# Patient Record
Sex: FEMALE | Race: WHITE | NOT HISPANIC OR LATINO | Employment: UNEMPLOYED | URBAN - NONMETROPOLITAN AREA
[De-identification: names, ages, dates, MRNs, and addresses within clinical notes are randomized per-mention and may not be internally consistent; named-entity substitution may affect disease eponyms.]

---

## 2018-06-12 ENCOUNTER — OFFICE VISIT (OUTPATIENT)
Dept: CARDIOLOGY | Facility: CLINIC | Age: 18
End: 2018-06-12

## 2018-06-12 ENCOUNTER — APPOINTMENT (OUTPATIENT)
Dept: LAB | Facility: HOSPITAL | Age: 18
End: 2018-06-12

## 2018-06-12 VITALS
HEART RATE: 109 BPM | OXYGEN SATURATION: 96 % | BODY MASS INDEX: 43.4 KG/M2 | SYSTOLIC BLOOD PRESSURE: 130 MMHG | WEIGHT: 293 LBS | HEIGHT: 69 IN | DIASTOLIC BLOOD PRESSURE: 96 MMHG

## 2018-06-12 DIAGNOSIS — R00.0 TACHYCARDIA: Primary | ICD-10-CM

## 2018-06-12 DIAGNOSIS — R07.2 PRECORDIAL PAIN: ICD-10-CM

## 2018-06-12 DIAGNOSIS — R06.02 SOB (SHORTNESS OF BREATH): ICD-10-CM

## 2018-06-12 PROCEDURE — 85007 BL SMEAR W/DIFF WBC COUNT: CPT | Performed by: PHYSICIAN ASSISTANT

## 2018-06-12 PROCEDURE — 93000 ELECTROCARDIOGRAM COMPLETE: CPT | Performed by: PHYSICIAN ASSISTANT

## 2018-06-12 PROCEDURE — 99204 OFFICE O/P NEW MOD 45 MIN: CPT | Performed by: PHYSICIAN ASSISTANT

## 2018-06-12 PROCEDURE — 80050 GENERAL HEALTH PANEL: CPT | Performed by: PHYSICIAN ASSISTANT

## 2018-06-12 RX ORDER — VENLAFAXINE HYDROCHLORIDE 150 MG/1
CAPSULE, EXTENDED RELEASE ORAL DAILY
COMMUNITY
Start: 2018-05-10 | End: 2019-05-10 | Stop reason: SDDI

## 2018-06-12 RX ORDER — RANITIDINE 300 MG/1
TABLET ORAL DAILY
COMMUNITY
Start: 2018-06-05 | End: 2021-10-19

## 2018-06-12 RX ORDER — CLOTRIMAZOLE 1 %
CREAM (GRAM) TOPICAL DAILY
COMMUNITY
Start: 2018-03-19 | End: 2019-02-08

## 2018-06-12 RX ORDER — ERGOCALCIFEROL 1.25 MG/1
CAPSULE ORAL WEEKLY
COMMUNITY
Start: 2018-05-25 | End: 2019-02-08

## 2018-06-12 NOTE — PATIENT INSTRUCTIONS
Heart-Healthy Eating Plan  Many factors influence your heart health, including eating and exercise habits. Heart (coronary) risk increases with abnormal blood fat (lipid) levels. Heart-healthy meal planning includes limiting unhealthy fats, increasing healthy fats, and making other small dietary changes. This includes maintaining a healthy body weight to help keep lipid levels within a normal range.  What is my plan?  Your health care provider recommends that you:  · Get no more than _________% of the total calories in your daily diet from fat.  · Limit your intake of saturated fat to less than _________% of your total calories each day.  · Limit the amount of cholesterol in your diet to less than _________ mg per day.  What types of fat should I choose?  · Choose healthy fats more often. Choose monounsaturated and polyunsaturated fats, such as olive oil and canola oil, flaxseeds, walnuts, almonds, and seeds.  · Eat more omega-3 fats. Good choices include salmon, mackerel, sardines, tuna, flaxseed oil, and ground flaxseeds. Aim to eat fish at least two times each week.  · Limit saturated fats. Saturated fats are primarily found in animal products, such as meats, butter, and cream. Plant sources of saturated fats include palm oil, palm kernel oil, and coconut oil.  · Avoid foods with partially hydrogenated oils in them. These contain trans fats. Examples of foods that contain trans fats are stick margarine, some tub margarines, cookies, crackers, and other baked goods.  What general guidelines do I need to follow?  · Check food labels carefully to identify foods with trans fats or high amounts of saturated fat.  · Fill one half of your plate with vegetables and green salads. Eat 4-5 servings of vegetables per day. A serving of vegetables equals 1 cup of raw leafy vegetables, ½ cup of raw or cooked cut-up vegetables, or ½ cup of vegetable juice.  · Fill one fourth of your plate with whole grains. Look for the word  "\"whole\" as the first word in the ingredient list.  · Fill one fourth of your plate with lean protein foods.  · Eat 4-5 servings of fruit per day. A serving of fruit equals one medium whole fruit, ¼ cup of dried fruit, ½ cup of fresh, frozen, or canned fruit, or ½ cup of 100% fruit juice.  · Eat more foods that contain soluble fiber. Examples of foods that contain this type of fiber are apples, broccoli, carrots, beans, peas, and barley. Aim to get 20-30 g of fiber per day.  · Eat more home-cooked food and less restaurant, buffet, and fast food.  · Limit or avoid alcohol.  · Limit foods that are high in starch and sugar.  · Avoid fried foods.  · Cook foods by using methods other than frying. Baking, boiling, grilling, and broiling are all great options. Other fat-reducing suggestions include:  ¨ Removing the skin from poultry.  ¨ Removing all visible fats from meats.  ¨ Skimming the fat off of stews, soups, and gravies before serving them.  ¨ Steaming vegetables in water or broth.  · Lose weight if you are overweight. Losing just 5-10% of your initial body weight can help your overall health and prevent diseases such as diabetes and heart disease.  · Increase your consumption of nuts, legumes, and seeds to 4-5 servings per week. One serving of dried beans or legumes equals ½ cup after being cooked, one serving of nuts equals 1½ ounces, and one serving of seeds equals ½ ounce or 1 tablespoon.  · You may need to monitor your salt (sodium) intake, especially if you have high blood pressure. Talk with your health care provider or dietitian to get more information about reducing sodium.  What foods can I eat?  Grains     Breads, including Nepalese, white, melissa, wheat, raisin, rye, oatmeal, and Italian. Tortillas that are neither fried nor made with lard or trans fat. Low-fat rolls, including hotdog and hamburger buns and English muffins. Biscuits. Muffins. Waffles. Pancakes. Light popcorn. Whole-grain cereals. Flatbread. " [place] toast. Pretzels. Breadsticks. Rusks. Low-fat snacks and crackers, including oyster, saltine, matzo, fany, animal, and rye. Rice and pasta, including brown rice and those that are made with whole wheat.  Vegetables   All vegetables.  Fruits   All fruits, but limit coconut.  Meats and Other Protein Sources   Lean, well-trimmed beef, veal, pork, and lamb. Chicken and turkey without skin. All fish and shellfish. Wild duck, rabbit, pheasant, and venison. Egg whites or low-cholesterol egg substitutes. Dried beans, peas, lentils, and tofu. Seeds and most nuts.  Dairy   Low-fat or nonfat cheeses, including ricotta, string, and mozzarella. Skim or 1% milk that is liquid, powdered, or evaporated. Buttermilk that is made with low-fat milk. Nonfat or low-fat yogurt.  Beverages   Mineral water. Diet carbonated beverages.  Sweets and Desserts   Sherbets and fruit ices. Honey, jam, marmalade, jelly, and syrups. Meringues and gelatins. Pure sugar candy, such as hard candy, jelly beans, gumdrops, mints, marshmallows, and small amounts of dark chocolate. Oliver food cake.  Eat all sweets and desserts in moderation.  Fats and Oils   Nonhydrogenated (trans-free) margarines. Vegetable oils, including soybean, sesame, sunflower, olive, peanut, safflower, corn, canola, and cottonseed. Salad dressings or mayonnaise that are made with a vegetable oil. Limit added fats and oils that you use for cooking, baking, salads, and as spreads.  Other   Cocoa powder. Coffee and tea. All seasonings and condiments.  The items listed above may not be a complete list of recommended foods or beverages. Contact your dietitian for more options.   What foods are not recommended?  Grains   Breads that are made with saturated or trans fats, oils, or whole milk. Croissants. Butter rolls. Cheese breads. Sweet rolls. Donuts. Buttered popcorn. Chow mein noodles. High-fat crackers, such as cheese or butter crackers.  Meats and Other Protein Sources   Fatty  meats, such as hotdogs, short ribs, sausage, spareribs, lemon, ribeye roast or steak, and mutton. High-fat deli meats, such as salami and bologna. Caviar. Domestic duck and goose. Organ meats, such as kidney, liver, sweetbreads, brains, gizzard, chitterlings, and heart.  Dairy   Cream, sour cream, cream cheese, and creamed cottage cheese. Whole milk cheeses, including blue (navjot), Love Fawad, Brie, Regulo, American, Havarti, Swiss, cheddar, Camembert, and [place]. Whole or 2% milk that is liquid, evaporated, or condensed. Whole buttermilk. Cream sauce or high-fat cheese sauce. Yogurt that is made from whole milk.  Beverages   Regular sodas and drinks with added sugar.  Sweets and Desserts   Frosting. Pudding. Cookies. Cakes other than garcia food cake. Candy that has milk chocolate or white chocolate, hydrogenated fat, butter, coconut, or unknown ingredients. Buttered syrups. Full-fat ice cream or ice cream drinks.  Fats and Oils   Gravy that has suet, meat fat, or shortening. Cocoa butter, hydrogenated oils, palm oil, coconut oil, palm kernel oil. These can often be found in baked products, candy, fried foods, nondairy creamers, and whipped toppings. Solid fats and shortenings, including lemon fat, salt pork, lard, and butter. Nondairy cream substitutes, such as coffee creamers and sour cream substitutes. Salad dressings that are made of unknown oils, cheese, or sour cream.  The items listed above may not be a complete list of foods and beverages to avoid. Contact your dietitian for more information.   This information is not intended to replace advice given to you by your health care provider. Make sure you discuss any questions you have with your health care provider.  Document Released: 09/26/2009 Document Revised: 07/07/2017 Document Reviewed: 06/11/2015  [employer] Interactive Patient Education © 2017 [employer]

## 2018-06-13 LAB
ALBUMIN SERPL-MCNC: 4.8 G/DL (ref 3.5–5)
ALBUMIN/GLOB SERPL: 1.9 G/DL (ref 1.5–2.5)
ALP SERPL-CCNC: 132 U/L (ref 35–104)
ALT SERPL W P-5'-P-CCNC: 53 U/L (ref 10–36)
ANION GAP SERPL CALCULATED.3IONS-SCNC: 10.9 MMOL/L (ref 3.6–11.2)
AST SERPL-CCNC: 36 U/L (ref 10–30)
BILIRUB SERPL-MCNC: 0.6 MG/DL (ref 0.2–1.8)
BUN BLD-MCNC: 8 MG/DL (ref 7–21)
BUN/CREAT SERPL: 9.5 (ref 7–25)
CALCIUM SPEC-SCNC: 9.5 MG/DL (ref 7.7–10)
CHLORIDE SERPL-SCNC: 104 MMOL/L (ref 99–112)
CO2 SERPL-SCNC: 25.1 MMOL/L (ref 24.3–31.9)
CREAT BLD-MCNC: 0.84 MG/DL (ref 0.43–1.29)
DEPRECATED RDW RBC AUTO: 40.8 FL (ref 37–54)
EOSINOPHIL # BLD MANUAL: 0.46 10*3/MM3 (ref 0–0.7)
EOSINOPHIL NFR BLD MANUAL: 5 % (ref 0–5)
ERYTHROCYTE [DISTWIDTH] IN BLOOD BY AUTOMATED COUNT: 13.4 % (ref 11.5–14.5)
GFR SERPL CREATININE-BSD FRML MDRD: 87 ML/MIN/1.73
GLOBULIN UR ELPH-MCNC: 2.5 GM/DL
GLUCOSE BLD-MCNC: 92 MG/DL (ref 70–110)
HCT VFR BLD AUTO: 41.5 % (ref 37–47)
HGB BLD-MCNC: 13.5 G/DL (ref 12–16)
HYPOCHROMIA BLD QL: NORMAL
LYMPHOCYTES # BLD MANUAL: 2.93 10*3/MM3 (ref 1–3)
LYMPHOCYTES NFR BLD MANUAL: 32 % (ref 21–51)
LYMPHOCYTES NFR BLD MANUAL: 9 % (ref 0–10)
MCH RBC QN AUTO: 27.3 PG (ref 27–33)
MCHC RBC AUTO-ENTMCNC: 32.5 G/DL (ref 33–37)
MCV RBC AUTO: 84 FL (ref 80–94)
MONOCYTES # BLD AUTO: 0.82 10*3/MM3 (ref 0.1–0.9)
NEUTROPHILS # BLD AUTO: 4.95 10*3/MM3 (ref 1.4–6.5)
NEUTROPHILS NFR BLD MANUAL: 54 % (ref 30–70)
OSMOLALITY SERPL CALC.SUM OF ELEC: 277.4 MOSM/KG (ref 273–305)
PLATELET # BLD AUTO: 589 10*3/MM3 (ref 130–400)
PMV BLD AUTO: 9.7 FL (ref 6–10)
POTASSIUM BLD-SCNC: 3.9 MMOL/L (ref 3.5–5.3)
PROT SERPL-MCNC: 7.3 G/DL (ref 6–8)
RBC # BLD AUTO: 4.94 10*6/MM3 (ref 4.2–5.4)
SMALL PLATELETS BLD QL SMEAR: NORMAL
SODIUM BLD-SCNC: 140 MMOL/L (ref 135–153)
TSH SERPL DL<=0.05 MIU/L-ACNC: 2.47 MIU/ML (ref 0.55–4.78)
WBC NRBC COR # BLD: 9.16 10*3/MM3 (ref 4.5–12.5)

## 2018-06-21 ENCOUNTER — OUTSIDE FACILITY SERVICE (OUTPATIENT)
Dept: CARDIOLOGY | Facility: CLINIC | Age: 18
End: 2018-06-21

## 2018-06-21 ENCOUNTER — HOSPITAL ENCOUNTER (OUTPATIENT)
Dept: CARDIOLOGY | Facility: HOSPITAL | Age: 18
Discharge: HOME | End: 2018-06-21
Admitting: PHYSICIAN ASSISTANT

## 2018-06-21 LAB
MAXIMAL PREDICTED HEART RATE: 201 BPM
STRESS TARGET HR: 171 BPM

## 2018-06-21 PROCEDURE — 93306 TTE W/DOPPLER COMPLETE: CPT

## 2018-06-21 PROCEDURE — 93306 TTE W/DOPPLER COMPLETE: CPT | Performed by: INTERNAL MEDICINE

## 2018-06-26 ENCOUNTER — DOCUMENTATION (OUTPATIENT)
Dept: CARDIOLOGY | Facility: CLINIC | Age: 18
End: 2018-06-26

## 2018-06-27 ENCOUNTER — TELEPHONE (OUTPATIENT)
Dept: CARDIOLOGY | Facility: CLINIC | Age: 18
End: 2018-06-27

## 2018-07-12 ENCOUNTER — OUTSIDE FACILITY SERVICE (OUTPATIENT)
Dept: CARDIOLOGY | Facility: CLINIC | Age: 18
End: 2018-07-12

## 2018-07-12 PROCEDURE — 93272 ECG/REVIEW INTERPRET ONLY: CPT | Performed by: INTERNAL MEDICINE

## 2018-07-17 ENCOUNTER — DOCUMENTATION (OUTPATIENT)
Dept: CARDIOLOGY | Facility: CLINIC | Age: 18
End: 2018-07-17

## 2018-07-17 NOTE — PROGRESS NOTES
Cardiac clearance req was received from Dr. Velasquez. This was reviewed by Jhon Vazquez PA-C and faxed back. -;[facility]

## 2019-02-08 ENCOUNTER — OFFICE VISIT (OUTPATIENT)
Dept: CARDIOLOGY | Facility: CLINIC | Age: 19
End: 2019-02-08

## 2019-02-08 ENCOUNTER — TELEPHONE (OUTPATIENT)
Dept: CARDIOLOGY | Facility: CLINIC | Age: 19
End: 2019-02-08

## 2019-02-08 VITALS
HEART RATE: 145 BPM | DIASTOLIC BLOOD PRESSURE: 89 MMHG | WEIGHT: 277.8 LBS | SYSTOLIC BLOOD PRESSURE: 154 MMHG | HEIGHT: 68 IN | BODY MASS INDEX: 42.1 KG/M2 | OXYGEN SATURATION: 94 %

## 2019-02-08 DIAGNOSIS — R06.02 SOB (SHORTNESS OF BREATH): ICD-10-CM

## 2019-02-08 DIAGNOSIS — R00.0 TACHYCARDIA: ICD-10-CM

## 2019-02-08 DIAGNOSIS — R00.0 TACHYCARDIA: Primary | ICD-10-CM

## 2019-02-08 DIAGNOSIS — R00.2 PALPITATIONS: ICD-10-CM

## 2019-02-08 PROCEDURE — 99214 OFFICE O/P EST MOD 30 MIN: CPT | Performed by: NURSE PRACTITIONER

## 2019-02-08 PROCEDURE — 93000 ELECTROCARDIOGRAM COMPLETE: CPT | Performed by: NURSE PRACTITIONER

## 2019-02-08 RX ORDER — PRENATAL VIT NO.126/IRON/FOLIC 28MG-0.8MG
TABLET ORAL DAILY
COMMUNITY
End: 2019-11-22

## 2019-02-08 RX ORDER — ALBUTEROL SULFATE 90 UG/1
2 AEROSOL, METERED RESPIRATORY (INHALATION) AS NEEDED
COMMUNITY
End: 2020-06-03

## 2019-02-08 RX ORDER — ATENOLOL 25 MG/1
25 TABLET ORAL DAILY
Qty: 30 TABLET | Refills: 11 | Status: SHIPPED | OUTPATIENT
Start: 2019-02-08 | End: 2019-02-08 | Stop reason: SDUPTHER

## 2019-02-08 RX ORDER — ATENOLOL 25 MG/1
25 TABLET ORAL DAILY
Qty: 30 TABLET | Refills: 11 | Status: SHIPPED | OUTPATIENT
Start: 2019-02-08 | End: 2019-05-10

## 2019-02-08 NOTE — PROGRESS NOTES
Subjective   Ike Soria is a 18 y.o. female     Chief Complaint   Patient presents with   • Follow-up     presents for 6 month f/u   • Palpitations   • Shortness of Breath       HPI  1. Tachycardia  1.1 Holter; ST with symptoms of fatigue  1.2 EKG; ST, normal axis, EPR, minor NSST-T, no acute ST changes noted.   2. Shortness of Breath  2.1 Echo; ef 50-55%, trace TR  3. Chest pain      The patient presents today for a 6 month follow up appointment.  She was last seen on 6/12/2018 by [facility] in the setting of tachycardia.  The patient did undergo labs, EKG, ECHO, and holter monitor.  I have discussed the findings of these test with her and her mother, whom accompanies her today.  She states that she is still experiencing tachycardia daily.  She tells me that she can hear her heartbeat at night when she is trying to rest.  She has ringing in her ears at times. She states that she has been trying to lose weight and finds it hard to exercise when her heart rate is so fast.  She states that she gets short of breath very easily when she feels her rapid heart rate.  She denies PND, orthopnea.  She has no further complaints otherwise at this time.      She consumes 1-2 caffinated beverages per day, she does not drink alcohol, she does not smoke, and she denies illicit drug use.  She has a history of borderline diabetes, and denies any anemia.    Current Outpatient Medications   Medication Sig Dispense Refill   • albuterol sulfate  (90 Base) MCG/ACT inhaler Inhale 2 puffs As Needed for Wheezing.     • aspirin 81 MG tablet Take 81 mg by mouth Daily.     • Prenatal Vit-Fe Fumarate-FA (PRENATAL, CLASSIC, VITAMIN) 28-0.8 MG tablet tablet Take  by mouth Daily.     • raNITIdine (ZANTAC) 300 MG tablet Daily.     • venlafaxine XR (EFFEXOR-XR) 150 MG 24 hr capsule Daily.     • atenolol (TENORMIN) 25 MG tablet Take 1 tablet by mouth Daily. 30 tablet 11     No current facility-administered medications for this visit.         ALLERGIES    Coconut flavor; Bee venom; Latex; and Penicillins    Past Medical History:   Diagnosis Date   • Asthma        Social History     Socioeconomic History   • Marital status: Single     Spouse name: Not on file   • Number of children: Not on file   • Years of education: Not on file   • Highest education level: Not on file   Social Needs   • Financial resource strain: Not on file   • Food insecurity - worry: Not on file   • Food insecurity - inability: Not on file   • Transportation needs - medical: Not on file   • Transportation needs - non-medical: Not on file   Occupational History   • Not on file   Tobacco Use   • Smoking status: Former Smoker     Packs/day: 0.50     Years: 2.00     Pack years: 1.00   • Smokeless tobacco: Never Used   Substance and Sexual Activity   • Alcohol use: No   • Drug use: No   • Sexual activity: Not on file   Other Topics Concern   • Not on file   Social History Narrative   • Not on file       Family History   Problem Relation Age of Onset   • Diabetes Mother    • Heart attack Mother        Review of Systems   Constitutional: Positive for diaphoresis and fatigue. Negative for chills and fever.   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 Negative for congestion, postnasal drip, rhinorrhea, sinus pressure, sinus pain, sneezing and sore throat.    Eyes: Positive for visual disturbance (wears glasses).   Respiratory: Positive for cough, shortness of breath (with daily activity and times at rest) and wheezing. Negative for apnea and chest tightness.    Cardiovascular: Positive for palpitations (racing) and leg swelling. Negative for chest pain.   Gastrointestinal: Negative for abdominal pain, blood in stool, constipation, diarrhea, nausea and vomiting.        Gerd   Endocrine: Negative.    Genitourinary: Negative.  Negative for hematuria.   Musculoskeletal: Negative.  Negative for  "arthralgias, back pain, myalgias and neck pain.        States her head will jerk to the left at times   Skin: Negative.    Allergic/Immunologic: Positive for food allergies (tree nuts). Negative for environmental allergies.   Neurological: Positive for dizziness (sporadic episodes; near syncope with exercise and increased activity). Negative for syncope, weakness, light-headedness, numbness and headaches.   Hematological: Bruises/bleeds easily (bruise).        Sees hematologist once a month (Dr Moya)   Psychiatric/Behavioral: Positive for agitation and sleep disturbance (insomnia). The patient is nervous/anxious.        Objective   /89 (BP Location: Left arm, Patient Position: Sitting)   Pulse (!) 145   Ht 172.7 cm (68\")   Wt 126 kg (277 lb 12.8 oz)   SpO2 94%   BMI 42.24 kg/m²   Vitals:    02/08/19 0919   BP: 154/89   BP Location: Left arm   Patient Position: Sitting   Pulse: (!) 145   SpO2: 94%   Weight: 126 kg (277 lb 12.8 oz)   Height: 172.7 cm (68\")      Lab Results (most recent)     None        Physical Exam  Physical Exam   Constitutional: She is oriented to person, place, and time. She appears well-developed and well-nourished. No distress.   HENT:   Head: Normocephalic and atraumatic.   Eyes: Conjunctivae and EOM are normal. Pupils are equal, round, and reactive to light.   Neck: Normal range of motion. Neck supple. No JVD present. No tracheal deviation present.   Cardiovascular: Normal rate, regular rhythm, normal heart sounds and intact distal pulses.    Pulmonary/Chest: Effort normal and breath sounds normal.   Abdominal: Soft. Bowel sounds are normal. She exhibits no distension and no mass. There is no tenderness. There is no rebound and no guarding.   Musculoskeletal: Normal range of motion. She exhibits no edema, tenderness or deformity.   Neurological: She is alert and oriented to person, place, and time.   Skin: Skin is warm and dry. No rash noted. No erythema. No pallor.   Psychiatric: " She has a normal mood and affect. Her behavior is normal. Judgment and thought content normal.   Nursing note and vitals reviewed.    Procedure     ECG 12 Lead  Date/Time: 2/8/2019 9:28 AM  Performed by: Joselin Mcnair APRN  Authorized by: Joselin Mcnair APRN   Comparison: compared with previous ECG from 6/12/2018  Rhythm: sinus tachycardia  Rate: tachycardic  QRS axis: normal    Clinical impression: non-specific ECG  Comments: Minor non specific ST-T wave changes, early precordial R wave progression                 Assessment/Plan      Ike denies ever taking any medications to treat her tachycardia.  I will start her on Atenolol, I have talked to her about this medication and given her a patient instruction sheet. Her prescription was sent to [facility].   I would like to see her back in 3 months or sooner if she has any problems.  The patients mother called back prior to visit and asked for her medicine to be switched to [facility].  The prescription for Atenolol 25 mg once daily was sent to the pharmacy.       Diagnosis Plan   1. Tachycardia  ECG 12 Lead   2. Palpitations  ECG 12 Lead   3. SOB (shortness of breath)  ECG 12 Lead       Return in about 3 months (around 5/8/2019).             Patient's Body mass index is 42.24 kg/m². BMI is above normal parameters. Recommendations include: educational material.      Electronically signed by:

## 2019-02-08 NOTE — TELEPHONE ENCOUNTER
----- Message from Magdalene Lord sent at 2/8/2019  9:52 AM EST -----  Regarding: RX TO [facility]  WANTS HER PRESCRIPTIONS CALLED INTO THE [facility] HERE IN [place]

## 2019-02-08 NOTE — PATIENT INSTRUCTIONS
Heart-Healthy Eating Plan  Many factors influence your heart health, including eating and exercise habits. Heart (coronary) risk increases with abnormal blood fat (lipid) levels. Heart-healthy meal planning includes limiting unhealthy fats, increasing healthy fats, and making other small dietary changes. This includes maintaining a healthy body weight to help keep lipid levels within a normal range.  What is my plan?  Your health care provider recommends that you:  · Get no more than _________% of the total calories in your daily diet from fat.  · Limit your intake of saturated fat to less than _________% of your total calories each day.  · Limit the amount of cholesterol in your diet to less than _________ mg per day.    What types of fat should I choose?  · Choose healthy fats more often. Choose monounsaturated and polyunsaturated fats, such as olive oil and canola oil, flaxseeds, walnuts, almonds, and seeds.  · Eat more omega-3 fats. Good choices include salmon, mackerel, sardines, tuna, flaxseed oil, and ground flaxseeds. Aim to eat fish at least two times each week.  · Limit saturated fats. Saturated fats are primarily found in animal products, such as meats, butter, and cream. Plant sources of saturated fats include palm oil, palm kernel oil, and coconut oil.  · Avoid foods with partially hydrogenated oils in them. These contain trans fats. Examples of foods that contain trans fats are stick margarine, some tub margarines, cookies, crackers, and other baked goods.  What general guidelines do I need to follow?  · Check food labels carefully to identify foods with trans fats or high amounts of saturated fat.  · Fill one half of your plate with vegetables and green salads. Eat 4-5 servings of vegetables per day. A serving of vegetables equals 1 cup of raw leafy vegetables, ½ cup of raw or cooked cut-up vegetables, or ½ cup of vegetable juice.  · Fill one fourth of your plate with whole grains. Look for the word  "\"whole\" as the first word in the ingredient list.  · Fill one fourth of your plate with lean protein foods.  · Eat 4-5 servings of fruit per day. A serving of fruit equals one medium whole fruit, ¼ cup of dried fruit, ½ cup of fresh, frozen, or canned fruit, or ½ cup of 100% fruit juice.  · Eat more foods that contain soluble fiber. Examples of foods that contain this type of fiber are apples, broccoli, carrots, beans, peas, and barley. Aim to get 20-30 g of fiber per day.  · Eat more home-cooked food and less restaurant, buffet, and fast food.  · Limit or avoid alcohol.  · Limit foods that are high in starch and sugar.  · Avoid fried foods.  · Cook foods by using methods other than frying. Baking, boiling, grilling, and broiling are all great options. Other fat-reducing suggestions include:  ? Removing the skin from poultry.  ? Removing all visible fats from meats.  ? Skimming the fat off of stews, soups, and gravies before serving them.  ? Steaming vegetables in water or broth.  · Lose weight if you are overweight. Losing just 5-10% of your initial body weight can help your overall health and prevent diseases such as diabetes and heart disease.  · Increase your consumption of nuts, legumes, and seeds to 4-5 servings per week. One serving of dried beans or legumes equals ½ cup after being cooked, one serving of nuts equals 1½ ounces, and one serving of seeds equals ½ ounce or 1 tablespoon.  · You may need to monitor your salt (sodium) intake, especially if you have high blood pressure. Talk with your health care provider or dietitian to get more information about reducing sodium.  What foods can I eat?  Grains    Breads, including Thai, white, melissa, wheat, raisin, rye, oatmeal, and Italian. Tortillas that are neither fried nor made with lard or trans fat. Low-fat rolls, including hotdog and hamburger buns and English muffins. Biscuits. Muffins. Waffles. Pancakes. Light popcorn. Whole-grain cereals. Flatbread. " [place] toast. Pretzels. Breadsticks. Rusks. Low-fat snacks and crackers, including oyster, saltine, matzo, fany, animal, and rye. Rice and pasta, including brown rice and those that are made with whole wheat.  Vegetables  All vegetables.  Fruits  All fruits, but limit coconut.  Meats and Other Protein Sources  Lean, well-trimmed beef, veal, pork, and lamb. Chicken and turkey without skin. All fish and shellfish. Wild duck, rabbit, pheasant, and venison. Egg whites or low-cholesterol egg substitutes. Dried beans, peas, lentils, and tofu. Seeds and most nuts.  Dairy  Low-fat or nonfat cheeses, including ricotta, string, and mozzarella. Skim or 1% milk that is liquid, powdered, or evaporated. Buttermilk that is made with low-fat milk. Nonfat or low-fat yogurt.  Beverages  Mineral water. Diet carbonated beverages.  Sweets and Desserts  Sherbets and fruit ices. Honey, jam, marmalade, jelly, and syrups. Meringues and gelatins. Pure sugar candy, such as hard candy, jelly beans, gumdrops, mints, marshmallows, and small amounts of dark chocolate. Oliver food cake.  Eat all sweets and desserts in moderation.  Fats and Oils  Nonhydrogenated (trans-free) margarines. Vegetable oils, including soybean, sesame, sunflower, olive, peanut, safflower, corn, canola, and cottonseed. Salad dressings or mayonnaise that are made with a vegetable oil. Limit added fats and oils that you use for cooking, baking, salads, and as spreads.  Other  Cocoa powder. Coffee and tea. All seasonings and condiments.  The items listed above may not be a complete list of recommended foods or beverages. Contact your dietitian for more options.  What foods are not recommended?  Grains  Breads that are made with saturated or trans fats, oils, or whole milk. Croissants. Butter rolls. Cheese breads. Sweet rolls. Donuts. Buttered popcorn. Chow mein noodles. High-fat crackers, such as cheese or butter crackers.  Meats and Other Protein Sources  Fatty meats, such  as hotdogs, short ribs, sausage, spareribs, lemon, ribeye roast or steak, and mutton. High-fat deli meats, such as salami and bologna. Caviar. Domestic duck and goose. Organ meats, such as kidney, liver, sweetbreads, brains, gizzard, chitterlings, and heart.  Dairy  Cream, sour cream, cream cheese, and creamed cottage cheese. Whole milk cheeses, including blue (navjot), [place] F[place], Brie, Regulo, American, Havarti, Swiss, cheddar, Camembert, and Thornton. Whole or 2% milk that is liquid, evaporated, or condensed. Whole buttermilk. Cream sauce or high-fat cheese sauce. Yogurt that is made from whole milk.  Beverages  Regular sodas and drinks with added sugar.  Sweets and Desserts  Frosting. Pudding. Cookies. Cakes other than garcia food cake. Candy that has milk chocolate or white chocolate, hydrogenated fat, butter, coconut, or unknown ingredients. Buttered syrups. Full-fat ice cream or ice cream drinks.  Fats and Oils  Gravy that has suet, meat fat, or shortening. Cocoa butter, hydrogenated oils, palm oil, coconut oil, palm kernel oil. These can often be found in baked products, candy, fried foods, nondairy creamers, and whipped toppings. Solid fats and shortenings, including lemon fat, salt pork, lard, and butter. Nondairy cream substitutes, such as coffee creamers and sour cream substitutes. Salad dressings that are made of unknown oils, cheese, or sour cream.  The items listed above may not be a complete list of foods and beverages to avoid. Contact your dietitian for more information.  This information is not intended to replace advice given to you by your health care provider. Make sure you discuss any questions you have with your health care provider.  Document Released: 09/26/2009 Document Revised: 07/07/2017 Document Reviewed: 06/11/2015  [employer] Interactive Patient Education © 2018 [employer]  Atenolol tablets  What is this medicine?  ATENOLOL (a TEN oh lole) is a beta-blocker. Beta-blockers reduce the  workload on the heart and help it to beat more regularly. This medicine is used to treat high blood pressure and to prevent chest pain. It is also used to protect the heart during a heart attack and to prevent an additional heart attack from occurring.  This medicine may be used for other purposes; ask your health care provider or pharmacist if you have questions.  COMMON BRAND NAME(S): Tenormin  What should I tell my health care provider before I take this medicine?  They need to know if you have any of these conditions:  -diabetes  -heart or vessel disease like slow heart rate, worsening heart failure, heart block, sick sinus syndrome or Raynaud's disease  -kidney disease  -lung or breathing disease, like asthma or emphysema  -pheochromocytoma  -thyroid disease  -an unusual or allergic reaction to atenolol, other beta-blockers, medicines, foods, dyes, or preservatives  -pregnant or trying to get pregnant  -breast-feeding  How should I use this medicine?  Take this medicine by mouth with a drink of water. Follow the directions on the prescription label. This medicine may be taken with or without food. Take your medicine at regular intervals. Do not take more medicine than directed. Do not stop taking this medicine suddenly. This could lead to serious heart-related effects.  Talk to your pediatrician regarding the use of this medicine in children. Special care may be needed.  Overdosage: If you think you have taken too much of this medicine contact a poison control center or emergency room at once.  NOTE: This medicine is only for you. Do not share this medicine with others.  What if I miss a dose?  If you miss a dose, take it as soon as you can. If it is almost time for your next dose, take only that dose. Do not take double or extra doses.  What may interact with this medicine?  This medicine may interact with the following medications:  -certain medicines for blood pressure, heart disease, irregular heart  beat  -clonidine  -digoxin  -diuretics  -dobutamine  -epinephrine  -isoproterenol  -NSAIDs, medicines for pain and inflammation, like ibuprofen or naproxen  -reserpine  This list may not describe all possible interactions. Give your health care provider a list of all the medicines, herbs, non-prescription drugs, or dietary supplements you use. Also tell them if you smoke, drink alcohol, or use illegal drugs. Some items may interact with your medicine.  What should I watch for while using this medicine?  Visit your doctor or health care professional for regular check ups. Check your blood pressure and pulse rate regularly. Ask your health care professional what your blood pressure and pulse rate should be, and when you should contact him or her.  You may get drowsy or dizzy. Do not drive, use machinery, or do anything that needs mental alertness until you know how this medicine affects you. Do not stand or sit up quickly. Alcohol may interfere with the effect of this medicine. Avoid alcoholic drinks.  This medicine can affect blood sugar levels. If you have diabetes, check with your doctor or health care professional before you change your diet or the dose of your diabetic medicine.  Do not treat yourself for coughs, colds, or pain while you are taking this medicine without asking your doctor or health care professional for advice. Some ingredients may increase your blood pressure.  What side effects may I notice from receiving this medicine?  Side effects that you should report to your doctor or health care professional as soon as possible:  -allergic reactions like skin rash, itching or hives, swelling of the face, lips, or tongue  -breathing problems  -changes in vision  -chest pain  -cold, tingling, or numb hands or feet  -depression  -fast, irregular heartbeat  -feeling faint or lightheaded, falls  -fever with sore throat  -rapid weight gain  -swollen ankles, legs  Side effects that usually do not require  medical attention (report to your doctor or health care professional if they continue or are bothersome):  -anxiety, nervous  -diarrhea  -dry skin  -change in sex drive or performance  -headache  -nightmares or trouble sleeping  -short term memory loss  -stomach upset  -unusually tired  This list may not describe all possible side effects. Call your doctor for medical advice about side effects. You may report side effects to FDA at 9-935-UPT-8299.  Where should I keep my medicine?  Keep out of the reach of children.  Store at room temperature between 20 and 25 degrees C (68 and 77 degrees F). Close tightly and protect from light. Throw away any unused medicine after the expiration date.  NOTE: This sheet is a summary. It may not cover all possible information. If you have questions about this medicine, talk to your doctor, pharmacist, or health care provider.  © 2018 [employer] (2014-08-24 14:21:28)

## 2019-02-08 NOTE — TELEPHONE ENCOUNTER
Per chart review, pt was just here and rx was sent to [facility]. Will send to [facility] and update px list.

## 2019-02-14 PROBLEM — R06.02 SOB (SHORTNESS OF BREATH): Status: ACTIVE | Noted: 2019-02-14

## 2019-02-14 PROBLEM — R00.0 TACHYCARDIA: Status: ACTIVE | Noted: 2019-02-14

## 2019-02-14 PROBLEM — R00.2 PALPITATIONS: Status: ACTIVE | Noted: 2019-02-14

## 2019-03-04 ENCOUNTER — TELEPHONE (OUTPATIENT)
Dept: CARDIOLOGY | Facility: CLINIC | Age: 19
End: 2019-03-04

## 2019-03-04 NOTE — TELEPHONE ENCOUNTER
----- Message from Joselin DENZEL Nobles sent at 3/4/2019 11:43 AM EST -----  Contact: [phone]  wanting granddaughter seen sooner or to speak to a nurse. The BP medication we put the patient on is not helping her at all. Please call at [phone]

## 2019-03-04 NOTE — TELEPHONE ENCOUNTER
Pt last saw DELBERT Lake on 2/8/19, pt is scheduled to see Joselin again on 5/10/19. Joselin is only supposed to see testing follow ups, not regular visits.     Pt's BP issues will have to be addressed by pt's regular provider, which is [facility]. Pt was put on Atenolol at the appt.       Spoke w/ pt's grandfather, he stated pt's heart still races and palpitates. States he's unsure of pt's BP. I asked if she's been checking her BP, he stated he wasn't sure. I stated that we need to know what her blood pressures are before we change the medication, because we won't know what dose pt needs. I stated that they can get an at home BP cuff or get BP check at a pharmacy. Requested that they get several Bps and call us back, he verbalized understanding.

## 2019-05-10 ENCOUNTER — OFFICE VISIT (OUTPATIENT)
Dept: CARDIOLOGY | Facility: CLINIC | Age: 19
End: 2019-05-10

## 2019-05-10 VITALS
HEIGHT: 68 IN | DIASTOLIC BLOOD PRESSURE: 72 MMHG | BODY MASS INDEX: 43.5 KG/M2 | WEIGHT: 287 LBS | SYSTOLIC BLOOD PRESSURE: 128 MMHG | OXYGEN SATURATION: 97 % | HEART RATE: 91 BPM

## 2019-05-10 DIAGNOSIS — R00.2 PALPITATIONS: ICD-10-CM

## 2019-05-10 DIAGNOSIS — R00.0 TACHYCARDIA: Primary | ICD-10-CM

## 2019-05-10 PROCEDURE — 99214 OFFICE O/P EST MOD 30 MIN: CPT | Performed by: NURSE PRACTITIONER

## 2019-05-10 RX ORDER — PANTOPRAZOLE SODIUM 40 MG/1
40 TABLET, DELAYED RELEASE ORAL DAILY
COMMUNITY
End: 2021-10-19

## 2019-05-10 RX ORDER — ATENOLOL 25 MG/1
25 TABLET ORAL 2 TIMES DAILY
Qty: 180 TABLET | Refills: 3 | Status: SHIPPED | OUTPATIENT
Start: 2019-05-10 | End: 2019-08-16 | Stop reason: SDUPTHER

## 2019-05-10 RX ORDER — CHOLECALCIFEROL (VITAMIN D3) 125 MCG
TABLET ORAL
COMMUNITY
End: 2019-08-16 | Stop reason: DRUGHIGH

## 2019-05-10 NOTE — PROGRESS NOTES
Subjective   Ike Soria is a 18 y.o. female     Chief Complaint   Patient presents with   • Rapid Heart Rate   • Palpitations       PROBLEM LIST:     1. Tachycardia  1.1 Holter; ST with symptoms of fatigue  1.2 EKG; ST, normal axis, EPR, minor NSST-T, no acute ST changes noted.   2. Shortness of Breath  2.1 Echo; ef 50-55%, trace TR  3. Chest pain        HPI:    Ike is a 19 yo female who presents today with her grandparents for follow up appointment.  She was last seen in the office on 2/8/2019 for tachycardia and palpitations.  We had prescribed Atenolol 25 mg daily and scheduled follow up care.  She is taking her atenolol daily but really has not noticed much of a change in her symptoms.  She states that she often feels her heart racing and she feels palpitations at times.  She has shortness of air with minimal exertion.  Her rapid heart rate has also affected her activities of daily living.  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  She also states that she does get chest discomfort and dizziness with her increased heart rate.  She was unable to wear her Holter monitor, and she states that she is unable to ambulate on a treadmill for a stress test.  She admits that she has deconditioned, is unable to lose weight due to symptoms.   Patient says that she does have lower extremity edema in the ankle region periodically.          CURRENT MEDICATION:    Current Outpatient Medications   Medication Sig Dispense Refill   • albuterol sulfate  (90 Base) MCG/ACT inhaler Inhale 2 puffs As Needed for Wheezing.     • aspirin 81 MG tablet Take 81 mg by mouth Daily.     • Ergocalciferol (VITAMIN D2) 2000 units tablet Take  by mouth.     • metFORMIN (GLUCOPHAGE) 500 MG tablet Take 500 mg by mouth 2 (Two) Times a Day With Meals.     • pantoprazole (PROTONIX) 40 MG EC tablet Take 40 mg by mouth Daily.     • Prenatal Vit-Fe Fumarate-FA (PRENATAL, CLASSIC, VITAMIN) 28-0.8 MG tablet  tablet Take  by mouth Daily.     • raNITIdine (ZANTAC) 300 MG tablet Daily.     • atenolol (TENORMIN) 25 MG tablet Take 1 tablet by mouth 2 (Two) Times a Day. 180 tablet 3     No current facility-administered medications for this visit.        ALLERGIES:    Coconut flavor; Nuts; Bee venom; Latex; and Penicillins    PAST MEDICAL HISTORY:    Past Medical History:   Diagnosis Date   • Asthma        SURGICAL HISTORY:    Past Surgical History:   Procedure Laterality Date   • CHOLECYSTECTOMY     • MOUTH SURGERY     • TONSILLECTOMY AND ADENOIDECTOMY     • TUMOR EXCISION      LEFT EAR   • WART REMOVAL         SOCIAL HISTORY:    Social History     Socioeconomic History   • Marital status: Single     Spouse name: Not on file   • Number of children: Not on file   • Years of education: Not on file   • Highest education level: Not on file   Tobacco Use   • Smoking status: Former Smoker     Packs/day: 0.50     Years: 2.00     Pack years: 1.00     Types: Cigarettes   • Smokeless tobacco: Never Used   Substance and Sexual Activity   • Alcohol use: No   • Drug use: No   • Sexual activity: Defer       FAMILY HISTORY:    Family History   Problem Relation Age of Onset   • Diabetes Mother    • Heart attack Mother        Review of Systems   Constitutional: Negative for fatigue.   HENT: Negative for congestion, postnasal drip, sneezing and sore throat.    Eyes: Positive for visual disturbance (daily ).   Respiratory: Positive for shortness of breath (during exertion only). Negative for apnea and chest tightness.    Cardiovascular: Positive for chest pain and palpitations (daily). Negative for leg swelling.   Gastrointestinal: Negative.  Negative for abdominal pain, constipation, diarrhea, nausea and vomiting.   Endocrine: Negative.  Negative for cold intolerance and heat intolerance.   Genitourinary: Negative.  Negative for difficulty urinating, frequency and urgency.   Musculoskeletal: Negative for arthralgias, back pain and neck pain.  "  Skin: Negative.  Negative for rash and wound.   Allergic/Immunologic: Positive for environmental allergies and food allergies.   Neurological: Positive for light-headedness and headaches (daily h/a). Negative for dizziness.   Hematological: Bruises/bleeds easily (buieses easily).   Psychiatric/Behavioral: Positive for agitation (easily agitated) and confusion (easily confused). Negative for sleep disturbance. The patient is nervous/anxious (easily nervous/anxious).        VISIT VITALS:    /72   Pulse 91   Ht 172.7 cm (68\")   Wt 130 kg (287 lb)   SpO2 97%   BMI 43.64 kg/m²     RECENT LABS:    Objective       Physical Exam   Constitutional: She is oriented to person, place, and time. She appears well-developed and well-nourished. No distress.   HENT:   Head: Normocephalic and atraumatic.   Eyes: Conjunctivae and EOM are normal. Pupils are equal, round, and reactive to light.   Neck: Normal range of motion. Neck supple. No JVD present. No tracheal deviation present.   Cardiovascular: Normal rate, regular rhythm, normal heart sounds and intact distal pulses.   Pulmonary/Chest: Effort normal and breath sounds normal.   Abdominal: Soft. Bowel sounds are normal. She exhibits no distension and no mass. There is no tenderness. There is no rebound and no guarding.   Musculoskeletal: Normal range of motion. She exhibits no edema, tenderness or deformity.   Neurological: She is alert and oriented to person, place, and time.   Skin: Skin is warm and dry. No rash noted. No erythema. No pallor.   Psychiatric: She has a normal mood and affect. Her behavior is normal. Judgment and thought content normal.   Nursing note and vitals reviewed.      Procedures      Assessment/Plan      Diagnosis Plan   1. Tachycardia  atenolol (TENORMIN) 25 MG tablet   2. Palpitations  atenolol (TENORMIN) 25 MG tablet     Will increase the patient's dose of atenolol to 25 mg twice daily.  I have instructed her to continue taking the " prescription she has had at home and I will call in a new prescription for the new dosage.  This medication is not seeming to help she is free to call our office we may have to try her on another medication.  We will follow-up in 2 months for a symptom check, sooner for problems.  I have strongly encouraged the patient to start walking; however she tells me that she starts feeling the rapid heartbeat just from walking from one side of the home to the other.  We have provided education on [employer].  I have also encouraged her to decrease her sodium intake due to some reported mild lower extremity edema at times.      Return in about 2 months (around 7/10/2019).   Patient's Body mass index is 43.64 kg/m². BMI is above normal parameters. Recommendations include: educational material.             DELBERT Lake

## 2019-05-10 NOTE — PATIENT INSTRUCTIONS
BMI for Adults  Body mass index (BMI) is a number that is calculated from a person's weight and height. In most adults, the number is used to find how much of an adult's weight is made up of fat. BMI is not as accurate as a direct measure of body fat.  How is BMI calculated?  BMI is calculated by dividing weight in kilograms by height in meters squared. It can also be calculated by dividing weight in pounds by height in inches squared, then multiplying the resulting number by 703. Charts are available to help you find your BMI quickly and easily without doing this calculation.  How is BMI interpreted?  Health care professionals use BMI charts to identify whether an adult is underweight, at a normal weight, or overweight based on the following guidelines:  · Underweight: BMI less than 18.5.  · Normal weight: BMI between 18.5 and 24.9.  · Overweight: BMI between 25 and 29.9.  · Obese: BMI of 30 and above.    BMI is usually interpreted the same for males and females.  Weight includes both fat and muscle, so someone with a muscular build, such as an athlete, may have a BMI that is higher than 24.9. In cases like these, BMI may not accurately depict body fat. To determine if excess body fat is the cause of a BMI of 25 or higher, further assessments may need to be done by a health care provider.  Why is BMI a useful tool?  BMI is used to identify a possible weight problem that may be related to a medical problem or may increase the risk for medical problems. BMI can also be used to promote changes to reach a healthy weight.  This information is not intended to replace advice given to you by your health care provider. Make sure you discuss any questions you have with your health care provider.  Document Released: 08/29/2005 Document Revised: 04/27/2017 Document Reviewed: 05/15/2015  [employer]gg Interactive Patient Education © 2018 [employer]    MyPlate from [employer]  MyPlate is an outline of a general healthy diet based on the  2010 Dietary Guidelines for Americans, from the U.S. Department of Agriculture (USDA). It sets guidelines for how much food you should eat from each food group based on your age, sex, and level of physical activity.  What are tips for following MyPlate?  To follow MyPlate recommendations:  · Eat a wide variety of fruits and vegetables, grains, and protein foods.  · Serve smaller portions and eat less food throughout the day.  · Limit portion sizes to avoid overeating.  · Enjoy your food.  · Get at least 150 minutes of exercise every week. This is about 30 minutes each day, 5 or more days per week.    It can be difficult to have every meal look like MyPlate. Think about MyPlate as eating guidelines for an entire day, rather than each individual meal.  Fruits and Vegetables  · Make half of your plate fruits and vegetables.  · Eat many different colors of fruits and vegetables each day.  · For a 2,000 calorie daily food plan, eat:  ? 2½ cups of vegetables every day.  ? 2 cups of fruit every day.  · 1 cup is equal to:  ? 1 cup raw or cooked vegetables.  ? 1 cup raw fruit.  ? 1 medium-sized orange, apple, or banana.  ? 1 cup 100% fruit or vegetable juice.  ? 2 cups raw leafy greens, such as lettuce, spinach, or kale.  ? ½ cup dried fruit.  Grains  · One fourth of your plate should be grains.  · Make at least half of the grains you eat each day whole grains.  · For a 2,000 calorie daily food plan, eat 6 oz of grains every day.  · 1 oz is equal to:  ? 1 slice bread.  ? 1 cup cereal.  ? ½ cup cooked rice, cereal, or pasta.  Protein  · One fourth of your plate should be protein.  · Eat a wide variety of protein foods, including meat, poultry, fish, eggs, beans, nuts, and tofu.  · For a 2,000 calorie daily food plan, eat 5½ oz of protein every day.  · 1 oz is equal to:  ? 1 oz meat, poultry, or fish.  ? ¼ cup cooked beans.  ? 1 egg.  ? ½ oz nuts or seeds.  ? 1 Tbsp peanut butter.  Dairy  · Drink fat-free or low-fat (1%)  milk.  · Eat or drink dairy as a side to meals.  · For a 2,000 calorie daily food plan, eat or drink 3 cups of dairy every day.  · 1 cup is equal to:  ? 1 cup milk, yogurt, cottage cheese, or soy milk (soy beverage).  ? 2 oz processed cheese.  ? 1½ oz natural cheese.  Fats, oils, salt, and sugars  · Only small amounts of oils are recommended.  · Avoid foods that are high in calories and low in nutritional value (empty calories), like foods high in fat or added sugars.  · Choose foods that are low in salt (sodium). Choose foods that have less than 140 milligrams (mg) of sodium per serving.  · Drink water instead of sugary drinks. Drink enough water each day to keep your urine pale yellow.  Where to find support  · Work with your health care provider or a nutrition specialist (dietitian) to develop a customized eating plan that is right for you.  · Download an ru (mobile application) to help you track your daily food intake.  Where to find more information  · Go to ChooseMyPlate.gov for more information.  · Learn more and log your daily food intake according to MyPlate using USDA's SuperTracker: www.Meaningfycker.usda.gov  Summary  · MyPlate is a general guideline for healthy eating from the USDA. It is based on the 2010 Dietary Guidelines for Americans.  · In general, fruits and vegetables should take up ½ of your plate, grains should take up ¼ of your plate, and protein should take up ¼ of your plate.  This information is not intended to replace advice given to you by your health care provider. Make sure you discuss any questions you have with your health care provider.  Document Released: 01/06/2009 Document Revised: 03/19/2018 Document Reviewed: 03/19/2018  [employer] Interactive Patient Education © 2019 [employer].

## 2019-08-16 ENCOUNTER — OFFICE VISIT (OUTPATIENT)
Dept: CARDIOLOGY | Facility: CLINIC | Age: 19
End: 2019-08-16

## 2019-08-16 VITALS
OXYGEN SATURATION: 95 % | SYSTOLIC BLOOD PRESSURE: 129 MMHG | WEIGHT: 293 LBS | DIASTOLIC BLOOD PRESSURE: 91 MMHG | HEART RATE: 91 BPM | BODY MASS INDEX: 44.41 KG/M2 | HEIGHT: 68 IN

## 2019-08-16 DIAGNOSIS — R00.0 TACHYCARDIA: ICD-10-CM

## 2019-08-16 DIAGNOSIS — R00.2 PALPITATIONS: ICD-10-CM

## 2019-08-16 PROCEDURE — 99213 OFFICE O/P EST LOW 20 MIN: CPT | Performed by: NURSE PRACTITIONER

## 2019-08-16 RX ORDER — NORETHINDRONE ACETATE AND ETHINYL ESTRADIOL AND FERROUS FUMARATE 1MG-20(21)
KIT ORAL DAILY
COMMUNITY
Start: 2019-07-18 | End: 2020-06-03

## 2019-08-16 RX ORDER — ESCITALOPRAM OXALATE 20 MG/1
TABLET ORAL DAILY
COMMUNITY
Start: 2019-07-23 | End: 2020-06-03

## 2019-08-16 RX ORDER — ATENOLOL 25 MG/1
25 TABLET ORAL 2 TIMES DAILY
Qty: 180 TABLET | Refills: 3 | Status: SHIPPED | OUTPATIENT
Start: 2019-08-16 | End: 2019-11-22

## 2019-08-16 RX ORDER — HYDROCODONE BITARTRATE AND ACETAMINOPHEN 5; 325 MG/1; MG/1
TABLET ORAL
COMMUNITY
Start: 2019-08-12 | End: 2019-11-22

## 2019-08-16 RX ORDER — ERGOCALCIFEROL 1.25 MG/1
CAPSULE ORAL WEEKLY
COMMUNITY
Start: 2019-08-02 | End: 2021-10-19

## 2019-08-16 NOTE — PROGRESS NOTES
Subjective   Ike Soria is a 18 y.o. female     Chief Complaint   Patient presents with   • Palpitations     Here for 2 mo. f/u   • Rapid Heart Rate       [facility]    PROBLEM LIST:   1. Tachycardia  1.1 Holter; ST with symptoms of fatigue  1.2 EKG; ST, normal axis, EPR, minor NSST-T, no acute ST changes noted.   2. Shortness of Breath  2.1 Echo; ef 50-55%, trace TR  3. Chest pain      Ike is a 18-year-old female who presents today with her grandparents for follow-up.  She was last seen in the office on May 10, 2019 for tachycardia and palpitations we had made changes to her atenolol at this time.  She continues to take her atenolol daily but did not increase her medication as directed at the last follow-up.  She states that she still often feels her heart racing and she feels palpitations at times.  She has shortness of air with minimal exertion.  Her rapid heart rate has also affected her activities of daily living.  She was unable to continue her job in the past because of her rapid heart rate.  She is currently looking to apply for disability.  She also states that she does get chest discomfort and dizziness with her increased heart rate.  She admits that she has deconditioned and is unable to lose weight due to her symptoms.  Patient tells me that she recently had a bone marrow biopsy to check for bone cancer.  She is supposed to get the results of this test back on Monday.        Current Outpatient Medications   Medication Sig Dispense Refill   • albuterol sulfate  (90 Base) MCG/ACT inhaler Inhale 2 puffs As Needed for Wheezing.     • aspirin 81 MG tablet Take 81 mg by mouth Daily.     • atenolol (TENORMIN) 25 MG tablet Take 1 tablet by mouth 2 (Two) Times a Day. 180 tablet 3   • escitalopram (LEXAPRO) 20 MG tablet Daily.     • HYDROcodone-acetaminophen (NORCO) 5-325 MG per tablet prn     • JUNEL FE 1/20 1-20 MG-MCG per tablet Daily.     • metFORMIN (GLUCOPHAGE) 500 MG tablet Take 500 mg by mouth 2 (Two)  "Times a Day With Meals.     • pantoprazole (PROTONIX) 40 MG EC tablet Take 40 mg by mouth Daily.     • Prenatal Vit-Fe Fumarate-FA (PRENATAL, CLASSIC, VITAMIN) 28-0.8 MG tablet tablet Take  by mouth Daily.     • raNITIdine (ZANTAC) 300 MG tablet Daily.     • vitamin D (ERGOCALCIFEROL) 25064 units capsule capsule 1 (One) Time Per Week.       No current facility-administered medications for this visit.        ALLERGIES    Coconut flavor; Nuts; Bee venom; Latex; and Penicillins    Past Medical History:   Diagnosis Date   • Asthma        Social History     Socioeconomic History   • Marital status: Single     Spouse name: Not on file   • Number of children: Not on file   • Years of education: Not on file   • Highest education level: Not on file   Tobacco Use   • Smoking status: Former Smoker     Packs/day: 0.50     Years: 2.00     Pack years: 1.00     Types: Cigarettes   • Smokeless tobacco: Never Used   Substance and Sexual Activity   • Alcohol use: No   • Drug use: No   • Sexual activity: Defer       Family History   Problem Relation Age of Onset   • Diabetes Mother    • Heart attack Mother        Review of Systems   Constitutional: Positive for fatigue.   HENT: Positive for congestion (nasal).    Eyes: Positive for visual disturbance (wears glasses).   Respiratory: Positive for shortness of breath.    Cardiovascular: Positive for chest pain, palpitations and leg swelling (occas.).   Gastrointestinal: Negative.    Endocrine: Negative.    Genitourinary: Negative.    Musculoskeletal: Negative.    Skin: Negative.    Allergic/Immunologic: Positive for environmental allergies.   Neurological: Positive for dizziness and light-headedness.   Hematological: Bruises/bleeds easily (bruise).   Psychiatric/Behavioral: Negative.        Objective   /91 (BP Location: Left arm, Patient Position: Sitting)   Pulse 91   Ht 172.7 cm (67.99\")   Wt 134 kg (294 lb 12.8 oz)   SpO2 95%   BMI 44.83 kg/m²   Vitals:    08/16/19 0850 " "  BP: 129/91   BP Location: Left arm   Patient Position: Sitting   Pulse: 91   SpO2: 95%   Weight: 134 kg (294 lb 12.8 oz)   Height: 172.7 cm (67.99\")      Lab Results (most recent)     None        Physical Exam   Constitutional: She is oriented to person, place, and time. She appears well-developed and well-nourished. No distress.   HENT:   Head: Normocephalic and atraumatic.   Eyes: Conjunctivae and EOM are normal. Pupils are equal, round, and reactive to light.   Neck: Normal range of motion. Neck supple. No JVD present. No tracheal deviation present.   Cardiovascular: Normal rate, regular rhythm, normal heart sounds and intact distal pulses.   Pulmonary/Chest: Effort normal and breath sounds normal.   Abdominal: Soft. Bowel sounds are normal. She exhibits no distension and no mass. There is no tenderness. There is no rebound and no guarding.   Musculoskeletal: Normal range of motion. She exhibits no edema, tenderness or deformity.   Neurological: She is alert and oriented to person, place, and time.   Skin: Skin is warm and dry. No rash noted. No erythema. No pallor.   Psychiatric: She has a normal mood and affect. Her behavior is normal. Judgment and thought content normal.   Nursing note and vitals reviewed.      Procedure   Procedures         Assessment/Plan      Diagnosis Plan   1. Tachycardia  atenolol (TENORMIN) 25 MG tablet   2. Palpitations  atenolol (TENORMIN) 25 MG tablet       Return in about 3 months (around 11/16/2019).    Talk to the patient and grandparents in regards to increasing her dose of atenolol to 25 mg twice daily.  I have sent in a new prescription for this medication to her local pharmacy.  I have strongly encouraged the patient to start walking, I advised her that she should walk to her mailbox and back at least daily.  She tells me that she will try her best to do this.  We will call the patient on Monday to find out the results of her bone marrow biopsy.  We will see the patient back " in approximately 3 months for symptom check and medication management, patient her family may call the office sooner if worsening symptoms were to develop.               Patient's Body mass index is 44.83 kg/m². BMI is above normal parameters. Recommendations include: educational material and referral to primary care.      Electronically signed by:

## 2019-08-19 ENCOUNTER — TELEPHONE (OUTPATIENT)
Dept: CARDIOLOGY | Facility: CLINIC | Age: 19
End: 2019-08-19

## 2019-08-19 NOTE — TELEPHONE ENCOUNTER
Per Joselin, call pt and see if she received the results of her bone marrox bx.   Also inform pt that our office doesn't do disability, so she will need to ask PCP.      First attempt to reach pt. Left a voicemail for pt to return my call at [phone].

## 2019-08-21 NOTE — TELEPHONE ENCOUNTER
Third attempt to reach pt. Left a voicemail for pt to return my call at [phone].      Sent letter.

## 2019-09-09 ENCOUNTER — TELEPHONE (OUTPATIENT)
Dept: CARDIOLOGY | Facility: CLINIC | Age: 19
End: 2019-09-09

## 2019-09-09 NOTE — TELEPHONE ENCOUNTER
Informed pt's guardian that our office doesn't di disability paperwork, but that they can try w/ PCP. She stated pt's bone marrow bx came back clear.   She denied needing anything further.

## 2019-09-09 NOTE — TELEPHONE ENCOUNTER
----- Message from Morelia Morales MA sent at 9/9/2019  4:04 PM EDT -----  Contact: [phone]  Appears she saw Joselin last. See not where you called on 08/19/2019 about bone marrow bx. Do you know what papers they are speaking of? -;[facility]  ----- Message -----  From: Belinda Cespedes MA  Sent: 9/9/2019  10:52 AM  To: [facility]    Patient's grandmother/guardian, Prisca, came by office checking status of papers left on last visit. She said Ike does not have cancer, whoever she spoke to was concerned and wanted to know the results.  She request a callback.

## 2019-11-22 ENCOUNTER — OFFICE VISIT (OUTPATIENT)
Dept: CARDIOLOGY | Facility: CLINIC | Age: 19
End: 2019-11-22

## 2019-11-22 VITALS
DIASTOLIC BLOOD PRESSURE: 88 MMHG | SYSTOLIC BLOOD PRESSURE: 125 MMHG | BODY MASS INDEX: 44.41 KG/M2 | HEART RATE: 95 BPM | WEIGHT: 293 LBS | OXYGEN SATURATION: 97 % | HEIGHT: 68 IN

## 2019-11-22 DIAGNOSIS — R00.0 TACHYCARDIA: Primary | ICD-10-CM

## 2019-11-22 DIAGNOSIS — R00.2 PALPITATIONS: ICD-10-CM

## 2019-11-22 PROCEDURE — 99213 OFFICE O/P EST LOW 20 MIN: CPT | Performed by: NURSE PRACTITIONER

## 2019-11-22 RX ORDER — LABETALOL 100 MG/1
100 TABLET, FILM COATED ORAL 2 TIMES DAILY
Qty: 180 TABLET | Refills: 4 | Status: SHIPPED | OUTPATIENT
Start: 2019-11-22 | End: 2020-07-08 | Stop reason: SDUPTHER

## 2019-11-22 RX ORDER — CHOLECALCIFEROL (VITAMIN D3) 125 MCG
500 CAPSULE ORAL DAILY
COMMUNITY
End: 2020-06-03

## 2019-11-22 NOTE — PROGRESS NOTES
Subjective   Ike Soria is a 19 y.o. female     Chief Complaint   Patient presents with   • Rapid Heart Rate     Here for a follow up       PROBLEM LIST:   1. Tachycardia  1.1 Holter; ST with symptoms of fatigue  1.2 EKG; ST, normal axis, EPR, minor NSST-T, no acute ST changes noted.   2. Shortness of Breath  2.1 Echo; ef 50-55%, trace TR  3. Chest pain      HPI:    Lala is a 18 yo female who is known to me for the treatment of tachycardia and palpitations.  We had increased her Atenolol dose at her last visit and she had reported improved symptoms.  She presents today with her family at her side for follow-up.  At her last visit she had reported that she was being tested for cancer, she reports that she does not have cancer.  She denies any chest pain or pressure.  She is still experiencing some palpitations and fluttering.  She reports an episode of dizziness recently, but denies any syncope or collapse.  She has continued to gain weight despite modifying her diet.  She has limited tolerance to exercise due to tachycardia and palpitations.  She denies any PND or orthopnea.   She reports that she and her boyfriend are trying to get pregnant.  She may be pregnant now, but will not know for a few more weeks.     PRIOR MEDICATIONS    Current Outpatient Medications on File Prior to Visit   Medication Sig Dispense Refill   • albuterol sulfate  (90 Base) MCG/ACT inhaler Inhale 2 puffs As Needed for Wheezing.     • aspirin 81 MG tablet Take 81 mg by mouth Daily.     • escitalopram (LEXAPRO) 20 MG tablet Daily.     • JUNEL FE 1/20 1-20 MG-MCG per tablet Daily.     • metFORMIN (GLUCOPHAGE) 500 MG tablet Take 500 mg by mouth 3 (Three) Times a Day.     • pantoprazole (PROTONIX) 40 MG EC tablet Take 40 mg by mouth Daily.     • raNITIdine (ZANTAC) 300 MG tablet Daily.     • vitamin B-12 (CYANOCOBALAMIN) 500 MCG tablet Take 500 mcg by mouth Daily.     • vitamin D (ERGOCALCIFEROL) 49722 units capsule capsule 1 (One)  Time Per Week.       No current facility-administered medications on file prior to visit.        ALLERGIES:    Coconut flavor; Nuts; Bee venom; Latex; and Penicillins    PAST MEDICAL HISTORY:    Past Medical History:   Diagnosis Date   • Asthma        SURGICAL HISTORY:    Past Surgical History:   Procedure Laterality Date   • BONE MARROW BIOPSY     • CHOLECYSTECTOMY     • MOUTH SURGERY     • TONSILLECTOMY AND ADENOIDECTOMY     • TUMOR EXCISION      LEFT EAR   • WART REMOVAL         SOCIAL HISTORY:    Social History     Socioeconomic History   • Marital status: Single     Spouse name: Not on file   • Number of children: Not on file   • Years of education: Not on file   • Highest education level: Not on file   Tobacco Use   • Smoking status: Former Smoker     Packs/day: 0.50     Years: 2.00     Pack years: 1.00     Types: Cigarettes   • Smokeless tobacco: Never Used   Substance and Sexual Activity   • Alcohol use: No   • Drug use: No   • Sexual activity: Defer       FAMILY HISTORY:    Family History   Problem Relation Age of Onset   • Diabetes Mother    • Heart attack Mother        Review of Systems   Constitutional: Positive for chills (in the last few days ) and fatigue (stays tired ). Negative for fever.   HENT: Negative for congestion, rhinorrhea and sore throat.    Eyes: Positive for visual disturbance (glasses daily ).   Respiratory: Negative.  Negative for chest tightness and shortness of breath.    Cardiovascular: Positive for palpitations (racing at times ). Negative for chest pain.   Gastrointestinal: Positive for vomiting (vomited this morning ). Negative for abdominal pain, blood in stool and nausea.   Endocrine: Negative.  Negative for cold intolerance and heat intolerance.   Genitourinary: Negative.  Negative for dysuria, frequency, hematuria and urgency.        Concerned that she might be pregnant    Musculoskeletal: Positive for arthralgias (joints ) and back pain (mid back pain ).   Skin: Negative.   "Negative for rash and wound.   Allergic/Immunologic: Positive for food allergies (nuts, coconut, cinnamon ). Negative for environmental allergies.   Neurological: Positive for light-headedness (when walking up stairs ) and headaches (headaches on the right side of her head ). Negative for dizziness and weakness.   Hematological: Bruises/bleeds easily (bruises easily ).   Psychiatric/Behavioral: Negative.  Negative for sleep disturbance (denies waking with smothering).       VISIT VITALS:    /88 (BP Location: Left arm, Patient Position: Sitting)   Pulse 95   Ht 172.7 cm (68\")   Wt (!) 137 kg (301 lb)   SpO2 97%   BMI 45.77 kg/m²     RECENT LABS:    Objective       Physical Exam   Constitutional: She is oriented to person, place, and time. Vital signs are normal. She appears well-developed and well-nourished.   HENT:   Head: Normocephalic.   Eyes: EOM are normal. Pupils are equal, round, and reactive to light.   Corrective lens     Neck: Trachea normal and normal range of motion. Neck supple. No JVD present. Carotid bruit is not present. No thyroid mass present.   Cardiovascular: Normal rate, regular rhythm, normal heart sounds and intact distal pulses. Exam reveals no gallop and no friction rub.   No murmur heard.  Pulses:       Radial pulses are 2+ on the right side, and 2+ on the left side.   Pulmonary/Chest: Effort normal and breath sounds normal. She has no wheezes. She has no rales.   Abdominal: Soft. Bowel sounds are normal.   Musculoskeletal: Normal range of motion. She exhibits no edema.   Neurological: She is alert and oriented to person, place, and time. She has normal strength.   Skin: Skin is warm, dry and intact. Capillary refill takes 2 to 3 seconds. There is pallor.   Psychiatric: She has a normal mood and affect. Her speech is normal and behavior is normal. Judgment and thought content normal. Cognition and memory are normal.   Nursing note and vitals " reviewed.      Procedures      Assessment/Plan      Diagnosis Plan   1. Tachycardia  labetalol (NORMODYNE) 100 MG tablet   2. Palpitations  labetalol (NORMODYNE) 100 MG tablet     I will discontinue Atenolol at this time due to possibility of pregnancy.  I will start the patient on Labetalol 100 mg daily and she will increase to 100 mg bid after taking for 1 week.  She will report any hypotension or problems to the office.  I would like to see Ike back in 3 months to follow up with these medication changes.  Nothing further at this time.  I have encouraged the patient to start an exercise regimen and to watch her carbohydrates and sugar intake.     Return in about 3 months (around 2/22/2020), or if symptoms worsen or fail to improve, for Next scheduled follow up.      Body mass index is 45.77 kg/m².      DELBERT [place]

## 2020-06-03 ENCOUNTER — CONSULT (OUTPATIENT)
Dept: ONCOLOGY | Facility: CLINIC | Age: 20
End: 2020-06-03

## 2020-06-03 ENCOUNTER — LAB (OUTPATIENT)
Dept: LAB | Facility: HOSPITAL | Age: 20
End: 2020-06-03

## 2020-06-03 VITALS
SYSTOLIC BLOOD PRESSURE: 115 MMHG | OXYGEN SATURATION: 99 % | DIASTOLIC BLOOD PRESSURE: 70 MMHG | HEIGHT: 68 IN | RESPIRATION RATE: 16 BRPM | HEART RATE: 95 BPM | WEIGHT: 293 LBS | TEMPERATURE: 98.4 F | BODY MASS INDEX: 44.41 KG/M2

## 2020-06-03 DIAGNOSIS — D75.839 THROMBOCYTHEMIA: Primary | ICD-10-CM

## 2020-06-03 LAB
ALBUMIN SERPL-MCNC: 4.2 G/DL (ref 3.5–5.2)
ALBUMIN/GLOB SERPL: 1.2 G/DL
ALP SERPL-CCNC: 85 U/L (ref 39–117)
ALT SERPL W P-5'-P-CCNC: 21 U/L (ref 1–33)
ANION GAP SERPL CALCULATED.3IONS-SCNC: 14.3 MMOL/L (ref 5–15)
AST SERPL-CCNC: 17 U/L (ref 1–32)
BASOPHILS # BLD AUTO: 0.07 10*3/MM3 (ref 0–0.2)
BASOPHILS NFR BLD AUTO: 0.6 % (ref 0–1.5)
BILIRUB SERPL-MCNC: 0.4 MG/DL (ref 0.2–1.2)
BUN BLD-MCNC: 9 MG/DL (ref 6–20)
BUN/CREAT SERPL: 10.7 (ref 7–25)
CALCIUM SPEC-SCNC: 9.2 MG/DL (ref 8.6–10.5)
CHLORIDE SERPL-SCNC: 103 MMOL/L (ref 98–107)
CO2 SERPL-SCNC: 24.7 MMOL/L (ref 22–29)
CREAT BLD-MCNC: 0.84 MG/DL (ref 0.57–1)
DEPRECATED RDW RBC AUTO: 42.2 FL (ref 37–54)
EOSINOPHIL # BLD AUTO: 0.19 10*3/MM3 (ref 0–0.4)
EOSINOPHIL NFR BLD AUTO: 1.8 % (ref 0.3–6.2)
ERYTHROCYTE [DISTWIDTH] IN BLOOD BY AUTOMATED COUNT: 14.3 % (ref 12.3–15.4)
ERYTHROCYTE [SEDIMENTATION RATE] IN BLOOD: 20 MM/HR (ref 0–20)
FERRITIN SERPL-MCNC: 11.75 NG/ML (ref 13–150)
FOLATE SERPL-MCNC: 15.1 NG/ML (ref 4.78–24.2)
GFR SERPL CREATININE-BSD FRML MDRD: 86 ML/MIN/1.73
GLOBULIN UR ELPH-MCNC: 3.4 GM/DL
GLUCOSE BLD-MCNC: 95 MG/DL (ref 65–99)
HCT VFR BLD AUTO: 38.6 % (ref 34–46.6)
HGB BLD-MCNC: 11.9 G/DL (ref 12–15.9)
IMM GRANULOCYTES # BLD AUTO: 0.04 10*3/MM3 (ref 0–0.05)
IMM GRANULOCYTES NFR BLD AUTO: 0.4 % (ref 0–0.5)
IRON 24H UR-MRATE: 97 MCG/DL (ref 37–145)
IRON SATN MFR SERPL: 16 % (ref 20–50)
LYMPHOCYTES # BLD AUTO: 2.68 10*3/MM3 (ref 0.7–3.1)
LYMPHOCYTES NFR BLD AUTO: 24.9 % (ref 19.6–45.3)
MCH RBC QN AUTO: 25.1 PG (ref 26.6–33)
MCHC RBC AUTO-ENTMCNC: 30.8 G/DL (ref 31.5–35.7)
MCV RBC AUTO: 81.3 FL (ref 79–97)
MONOCYTES # BLD AUTO: 0.62 10*3/MM3 (ref 0.1–0.9)
MONOCYTES NFR BLD AUTO: 5.8 % (ref 5–12)
NEUTROPHILS # BLD AUTO: 7.17 10*3/MM3 (ref 1.7–7)
NEUTROPHILS NFR BLD AUTO: 66.5 % (ref 42.7–76)
NRBC BLD AUTO-RTO: 0 /100 WBC (ref 0–0.2)
PLATELET # BLD AUTO: 624 10*3/MM3 (ref 140–450)
PMV BLD AUTO: 9.3 FL (ref 6–12)
POTASSIUM BLD-SCNC: 4.2 MMOL/L (ref 3.5–5.2)
PROT SERPL-MCNC: 7.6 G/DL (ref 6–8.5)
RBC # BLD AUTO: 4.75 10*6/MM3 (ref 3.77–5.28)
SODIUM BLD-SCNC: 142 MMOL/L (ref 136–145)
TIBC SERPL-MCNC: 597 MCG/DL (ref 298–536)
TRANSFERRIN SERPL-MCNC: 401 MG/DL (ref 200–360)
VIT B12 BLD-MCNC: 316 PG/ML (ref 211–946)
WBC NRBC COR # BLD: 10.77 10*3/MM3 (ref 3.4–10.8)

## 2020-06-03 PROCEDURE — 82607 VITAMIN B-12: CPT | Performed by: INTERNAL MEDICINE

## 2020-06-03 PROCEDURE — 99205 OFFICE O/P NEW HI 60 MIN: CPT | Performed by: INTERNAL MEDICINE

## 2020-06-03 PROCEDURE — 82746 ASSAY OF FOLIC ACID SERUM: CPT | Performed by: INTERNAL MEDICINE

## 2020-06-03 PROCEDURE — 81403 MOPATH PROCEDURE LEVEL 4: CPT | Performed by: INTERNAL MEDICINE

## 2020-06-03 PROCEDURE — 83540 ASSAY OF IRON: CPT | Performed by: INTERNAL MEDICINE

## 2020-06-03 PROCEDURE — 82668 ASSAY OF ERYTHROPOIETIN: CPT | Performed by: INTERNAL MEDICINE

## 2020-06-03 PROCEDURE — 82728 ASSAY OF FERRITIN: CPT | Performed by: INTERNAL MEDICINE

## 2020-06-03 PROCEDURE — 81270 JAK2 GENE: CPT | Performed by: INTERNAL MEDICINE

## 2020-06-03 PROCEDURE — 84165 PROTEIN E-PHORESIS SERUM: CPT | Performed by: INTERNAL MEDICINE

## 2020-06-03 PROCEDURE — 85025 COMPLETE CBC W/AUTO DIFF WBC: CPT | Performed by: INTERNAL MEDICINE

## 2020-06-03 PROCEDURE — 85651 RBC SED RATE NONAUTOMATED: CPT | Performed by: INTERNAL MEDICINE

## 2020-06-03 PROCEDURE — 84466 ASSAY OF TRANSFERRIN: CPT | Performed by: INTERNAL MEDICINE

## 2020-06-03 PROCEDURE — 81207 BCR/ABL1 GENE MINOR BP: CPT

## 2020-06-03 PROCEDURE — 36415 COLL VENOUS BLD VENIPUNCTURE: CPT | Performed by: INTERNAL MEDICINE

## 2020-06-03 PROCEDURE — 80053 COMPREHEN METABOLIC PANEL: CPT | Performed by: INTERNAL MEDICINE

## 2020-06-03 PROCEDURE — 81206 BCR/ABL1 GENE MAJOR BP: CPT

## 2020-06-03 RX ORDER — ONDANSETRON 4 MG/1
4 TABLET, FILM COATED ORAL EVERY 8 HOURS PRN
COMMUNITY
End: 2021-10-19

## 2020-06-03 NOTE — PROGRESS NOTES
DATE OF CONSULTATION: 6/3/2020    REFERRING PHYSICIAN: Dario Herrera DO    Dear Dario Hicks DO  Thank you for asking for my medical advice on this patient. I saw her in the  [place] office on 6/3/2020    REASON FOR CONSULTATION: Thrombocythemia    HISTORY OF PRESENT ILLNESS: The patient is a very pleasant 19 y.o.  female   who was in her usual state of health until early 2018 she presented with asymptomatic thrombocytosis.  This has been getting gradually worse most recently more than 1 million.  The patient was seen by Dr. Muñoz [facility] at [place].  She had bone marrow biopsy done September 2019 that showed reactive changes according to the patient.  She has been followed with serial labs.  She is seen today for second time.    SUBJECTIVE: When I saw the patient today she is here with her grandmother.  She is doing fairly well.  She denies any fever chills night sweats.  She is complaining of mild fatigue.  Never had a blood clot before denies any bleeding no skin bruises.    Review of Systems   Constitutional: Negative for activity change, appetite change, chills, fatigue, fever and unexpected weight change.   HENT: Negative for hearing loss, mouth sores, nosebleeds, sore throat and trouble swallowing.    Eyes: Negative for visual disturbance.   Respiratory: Negative for cough, chest tightness, shortness of breath and wheezing.    Cardiovascular: Negative for chest pain, palpitations and leg swelling.   Gastrointestinal: Negative for abdominal distention, abdominal pain, blood in stool, constipation, diarrhea, nausea, rectal pain and vomiting.   Endocrine: Negative for cold intolerance and heat intolerance.   Genitourinary: Negative for difficulty urinating, dysuria, frequency and urgency.   Musculoskeletal: Negative for arthralgias, back pain, gait problem, joint swelling and myalgias.   Skin: Negative for rash.   Neurological: Negative for dizziness, tremors, syncope, weakness,  light-headedness, numbness and headaches.   Hematological: Negative for adenopathy. Does not bruise/bleed easily.   Psychiatric/Behavioral: Negative for confusion, sleep disturbance and suicidal ideas. The patient is not nervous/anxious.        Past Medical History:   Diagnosis Date   • Asthma        Social History     Socioeconomic History   • Marital status: Single     Spouse name: Not on file   • Number of children: Not on file   • Years of education: Not on file   • Highest education level: Not on file   Tobacco Use   • Smoking status: Former Smoker     Packs/day: 1.00     Years: 2.00     Pack years: 2.00     Types: Cigarettes     Last attempt to quit:      Years since quittin.4   • Smokeless tobacco: Never Used   Substance and Sexual Activity   • Alcohol use: No   • Drug use: No   • Sexual activity: Defer       Family History   Problem Relation Age of Onset   • Diabetes Mother    • Heart attack Mother    • Bone cancer Maternal Great-Grandmother        Past Surgical History:   Procedure Laterality Date   • BONE MARROW BIOPSY     • CHOLECYSTECTOMY     • MOUTH SURGERY     • TONSILLECTOMY AND ADENOIDECTOMY     • TUMOR EXCISION      LEFT EAR-non cancerous   • WART REMOVAL         Allergies   Allergen Reactions   • Bee Venom Swelling   • Coconut Flavor Anaphylaxis   • Nuts Anaphylaxis   • Latex Rash   • Penicillins Itching          Current Outpatient Medications:   •  aspirin 81 MG tablet, Take 81 mg by mouth Daily., Disp: , Rfl:   •  Ferrous Sulfate (FE TABS PO), Take 285 mg by mouth Daily., Disp: , Rfl:   •  labetalol (NORMODYNE) 100 MG tablet, Take 1 tablet by mouth 2 (Two) Times a Day., Disp: 180 tablet, Rfl: 4  •  metFORMIN (GLUCOPHAGE) 500 MG tablet, Take 500 mg by mouth Daily With Breakfast., Disp: , Rfl:   •  ondansetron (ZOFRAN) 4 MG tablet, Take 4 mg by mouth Every 8 (Eight) Hours As Needed for Nausea or Vomiting., Disp: , Rfl:   •  pantoprazole (PROTONIX) 40 MG EC tablet, Take 40 mg by mouth  "Daily., Disp: , Rfl:   •  raNITIdine (ZANTAC) 300 MG tablet, Daily., Disp: , Rfl:   •  vitamin D (ERGOCALCIFEROL) 76123 units capsule capsule, 1 (One) Time Per Week., Disp: , Rfl:     PHYSICAL EXAMINATION:   /70   Pulse 95   Temp 98.4 °F (36.9 °C) (Temporal)   Resp 16   Ht 172.7 cm (68\")   Wt (!) 142 kg (314 lb)   SpO2 99%   BMI 47.74 kg/m²   Pain Score    06/03/20 1013   PainSc:   2   PainLoc: Hip  Comment: right       ECOG Performance Status: 1 - Symptomatic but completely ambulatory  General Appearance:  alert, cooperative, no apparent distress and appears stated age   Neurologic/Psychiatric: A&O x 3, gait steady, appropriate affect, strength 5/5 in all muscle groups   HEENT:  Normocephalic, without obvious abnormality, mucous membranes moist   Neck: Supple, symmetrical, trachea midline, no adenopathy;  No thyromegaly, masses, or tenderness   Lungs:   Clear to auscultation bilaterally; respirations regular, even, and unlabored bilaterally   Heart:  Regular rate and rhythm, no murmurs appreciated   Abdomen:   Soft, non-tender, non-distended and no organomegaly   Lymph nodes: No cervical, supraclavicular, inguinal or axillary adenopathy noted   Extremities: Normal, atraumatic; no clubbing, cyanosis, or edema    Skin: No rashes, ulcers, or suspicious lesions noted       No visits with results within 2 Week(s) from this visit.   Latest known visit with results is:   Office Visit on 06/12/2018   Component Date Value Ref Range Status   • Target HR (85%) 06/21/2018 171  bpm Final   • Max. Pred. HR (100%) 06/21/2018 201  bpm Final   • Glucose 06/12/2018 92  70 - 110 mg/dL Final   • BUN 06/12/2018 8  7 - 21 mg/dL Final   • Creatinine 06/12/2018 0.84  0.43 - 1.29 mg/dL Final   • Sodium 06/12/2018 140  135 - 153 mmol/L Final   • Potassium 06/12/2018 3.9  3.5 - 5.3 mmol/L Final   • Chloride 06/12/2018 104  99 - 112 mmol/L Final   • CO2 06/12/2018 25.1  24.3 - 31.9 mmol/L Final   • Calcium 06/12/2018 9.5  7.7 - " 10.0 mg/dL Final   • Total Protein 06/12/2018 7.3  6.0 - 8.0 g/dL Final   • Albumin 06/12/2018 4.80  3.50 - 5.00 g/dL Final   • ALT (SGPT) 06/12/2018 53* 10 - 36 U/L Final   • AST (SGOT) 06/12/2018 36* 10 - 30 U/L Final   • Alkaline Phosphatase 06/12/2018 132* 35 - 104 U/L Final    Note New Reference Ranges   • Total Bilirubin 06/12/2018 0.6  0.2 - 1.8 mg/dL Final   • eGFR Non  Amer 06/12/2018 87  >60 mL/min/1.73 Final   • Globulin 06/12/2018 2.5  gm/dL Final   • A/G Ratio 06/12/2018 1.9  1.5 - 2.5 g/dL Final   • BUN/Creatinine Ratio 06/12/2018 9.5  7.0 - 25.0 Final   • Anion Gap 06/12/2018 10.9  3.6 - 11.2 mmol/L Final   • TSH 06/12/2018 2.467  0.550 - 4.780 mIU/mL Final   • WBC 06/12/2018 9.16  4.50 - 12.50 10*3/mm3 Final   • RBC 06/12/2018 4.94  4.20 - 5.40 10*6/mm3 Final   • Hemoglobin 06/12/2018 13.5  12.0 - 16.0 g/dL Final   • Hematocrit 06/12/2018 41.5  37.0 - 47.0 % Final   • MCV 06/12/2018 84.0  80.0 - 94.0 fL Final   • MCH 06/12/2018 27.3  27.0 - 33.0 pg Final   • MCHC 06/12/2018 32.5* 33.0 - 37.0 g/dL Final   • RDW 06/12/2018 13.4  11.5 - 14.5 % Final   • RDW-SD 06/12/2018 40.8  37.0 - 54.0 fl Final   • MPV 06/12/2018 9.7  6.0 - 10.0 fL Final   • Platelets 06/12/2018 589* 130 - 400 10*3/mm3 Final   • Osmolality Calc 06/12/2018 277.4  273.0 - 305.0 mOsm/kg Final   • Neutrophil % 06/12/2018 54.0  30.0 - 70.0 % Final   • Lymphocyte % 06/12/2018 32.0  21.0 - 51.0 % Final   • Monocyte % 06/12/2018 9.0  0.0 - 10.0 % Final   • Eosinophil % 06/12/2018 5.0  0.0 - 5.0 % Final   • Neutrophils Absolute 06/12/2018 4.95  1.40 - 6.50 10*3/mm3 Final   • Lymphocytes Absolute 06/12/2018 2.93  1.00 - 3.00 10*3/mm3 Final   • Monocytes Absolute 06/12/2018 0.82  0.10 - 0.90 10*3/mm3 Final   • Eosinophils Absolute 06/12/2018 0.46  0.00 - 0.70 10*3/mm3 Final   • Hypochromia 06/12/2018 Slight/1+  None Seen Final   • Platelet Estimate 06/12/2018 Increased  Normal Final        No results found.      DIAGNOSTIC DATA:   1.  Radiology: None available  2. Dr. Herrera's note from September 2019 reviewed by me and documented in the  chart.   3. Pathology report: Bone marrow biopsy consistent with pleasant 19 revealed reactive changes  4. Laboratory data: As above    ASSESSMENT: The patient is a very pleasant 19 y.o.  female  with homocystinemia    PROBLEM LIST:   1.  Thrombocythemia:  A.  Present with asymptomatic abnormal CBC with platelet count of 600,000 June 2018  B.  Gradually getting worse most recently more than 1 million  C.  Bone marrow biopsy done September 2019 revealed reactive changes  B.  Followed by Dr. Toni Montero  2.  Type 2 diabetes  3.  Morbid obesity    PLAN:   1. I had a long discussion today with the patient and her grandmother about her  new diagnosis of thrombocythemia. I did go over the final pathology report in  detail with both of them. I reviewed the patient's documents including refereing provider's notes, lab results, imaging, and path report.   2.  I requested the bone marrow biopsy report as well as most recent office notes from her local hematologist.  3.  I will do blood work today including repeat CBC, serum erythropoietin, iron profile serum ferritin, Tony 2 mutation including V6 1 7 as well as exon 12 mutation, CEZAR R mutation, ESR, serum protein electropheresis, and BCR ABL by PCR.  4.  I will order liver spleen ultrasound to rule out organomegalies  5.  Differential diagnosis includes reactive versus myeloproliferative neoplasms.  6.  Continue aspirin 81 mg daily.  Does not seem patient has acquired von Willebrand deficiency.  She has no signs or symptoms of bleeding.  7.  Continue metformin for type 2 diabetes.  8.  The patient follow-up with me in 2 weeks to go over the results  Nuno Ramachandran MD  6/3/2020

## 2020-06-04 LAB
ALBUMIN SERPL-MCNC: 3.4 G/DL (ref 2.9–4.4)
ALBUMIN/GLOB SERPL: 0.9 {RATIO} (ref 0.7–1.7)
ALPHA1 GLOB FLD ELPH-MCNC: 0.4 G/DL (ref 0–0.4)
ALPHA2 GLOB SERPL ELPH-MCNC: 1.1 G/DL (ref 0.4–1)
B-GLOBULIN SERPL ELPH-MCNC: 1.3 G/DL (ref 0.7–1.3)
ETHNIC BACKGROUND STATED: 10.2 MIU/ML (ref 2.6–18.5)
GAMMA GLOB SERPL ELPH-MCNC: 0.8 G/DL (ref 0.4–1.8)
GLOBULIN SER CALC-MCNC: 3.7 G/DL (ref 2.2–3.9)
Lab: ABNORMAL
M-SPIKE: ABNORMAL G/DL
PROT SERPL-MCNC: 7.1 G/DL (ref 6–8.5)
SPECIMEN STATUS: NORMAL

## 2020-06-09 ENCOUNTER — HOSPITAL ENCOUNTER (OUTPATIENT)
Dept: ULTRASOUND IMAGING | Facility: HOSPITAL | Age: 20
Discharge: HOME | End: 2020-06-09

## 2020-06-09 ENCOUNTER — HOSPITAL ENCOUNTER (OUTPATIENT)
Dept: ULTRASOUND IMAGING | Facility: HOSPITAL | Age: 20
Discharge: HOME | End: 2020-06-09
Admitting: INTERNAL MEDICINE

## 2020-06-09 DIAGNOSIS — D75.839 THROMBOCYTHEMIA: ICD-10-CM

## 2020-06-09 PROCEDURE — 76705 ECHO EXAM OF ABDOMEN: CPT

## 2020-06-11 LAB
INTERPRETATION: NORMAL
LAB DIRECTOR NAME PROVIDER: NORMAL
LABORATORY COMMENT REPORT: NORMAL
Lab: NORMAL
T(ABL1,BCR)B2A2/CONTROL (IS) BLD/T: NORMAL %
T(ABL1,BCR)B3A2 MAR QL: NORMAL %
T(ABL1,BCR)E1A2/CONTROL BLD/T: NORMAL %

## 2020-06-16 LAB
JAK2 EXONS 12-15 MUT DET PCR: NORMAL
JAK2 P.V617F BLD/T QL: NORMAL
LAB DIRECTOR NAME PROVIDER: NORMAL
LAB DIRECTOR NAME PROVIDER: NORMAL
LABORATORY COMMENT REPORT: NORMAL
LABORATORY COMMENT REPORT: NORMAL
Lab: NORMAL
METHOD: NORMAL
REFLEX: NORMAL
SPECIMEN PREPARATION: NORMAL

## 2020-07-08 ENCOUNTER — OFFICE VISIT (OUTPATIENT)
Dept: CARDIOLOGY | Facility: CLINIC | Age: 20
End: 2020-07-08

## 2020-07-08 VITALS
OXYGEN SATURATION: 98 % | BODY MASS INDEX: 44.41 KG/M2 | SYSTOLIC BLOOD PRESSURE: 131 MMHG | HEIGHT: 68 IN | DIASTOLIC BLOOD PRESSURE: 82 MMHG | WEIGHT: 293 LBS | HEART RATE: 108 BPM

## 2020-07-08 DIAGNOSIS — R06.02 SOB (SHORTNESS OF BREATH): ICD-10-CM

## 2020-07-08 DIAGNOSIS — R00.2 PALPITATIONS: ICD-10-CM

## 2020-07-08 DIAGNOSIS — R00.0 TACHYCARDIA: Primary | ICD-10-CM

## 2020-07-08 PROCEDURE — 99213 OFFICE O/P EST LOW 20 MIN: CPT | Performed by: NURSE PRACTITIONER

## 2020-07-08 PROCEDURE — 93000 ELECTROCARDIOGRAM COMPLETE: CPT | Performed by: NURSE PRACTITIONER

## 2020-07-08 RX ORDER — LABETALOL 100 MG/1
100 TABLET, FILM COATED ORAL 2 TIMES DAILY
Qty: 60 TABLET | Refills: 6 | Status: SHIPPED | OUTPATIENT
Start: 2020-07-08 | End: 2020-11-23 | Stop reason: SDUPTHER

## 2020-07-08 RX ORDER — ALBUTEROL SULFATE 90 UG/1
2 AEROSOL, METERED RESPIRATORY (INHALATION) EVERY 4 HOURS PRN
Qty: 1 INHALER | Refills: 1 | Status: SHIPPED | OUTPATIENT
Start: 2020-07-08 | End: 2022-09-01

## 2020-07-08 NOTE — PROGRESS NOTES
Subjective     Ike Soria is a 19 y.o. female who presents to day for Rapid Heart Rate.    CHIEF COMPLIANT  Chief Complaint   Patient presents with   • Rapid Heart Rate       Active Problems:  Problem List Items Addressed This Visit        Cardiovascular and Mediastinum    Tachycardia - Primary    Relevant Medications    labetalol (NORMODYNE) 100 MG tablet    Other Relevant Orders    ECG 12 Lead    Palpitations    Relevant Medications    labetalol (NORMODYNE) 100 MG tablet       Respiratory    SOB (shortness of breath)    Relevant Medications    albuterol sulfate  (90 Base) MCG/ACT inhaler      OBLEM LIST:   1. Tachycardia  1.1 Holter; ST with symptoms of fatigue  1.2 EKG; ST, normal axis, EPR, minor NSST-T, no acute ST changes noted.   2. Shortness of Breath  2.1 Echo; ef 50-55%, trace TR  3. Chest pain    HPI  HPI  Ms. Soria is a 19-year-old female who is being followed up today for her tachycardia and palpitations.  She is currently taking labetalol 100 mg twice daily for h er palpitations and tachycardia.  She describes her palpitations as it skipping beats.  This is chronic in nature and occurs intermittently in which she has no associated symptoms or obvious aggravating factors.  They usually resolve on their own without intervention.  She has noticed a dramatic improvement since she has been on beta-blocker therapy.  She also has shortness of breath that occurs if she talks, active and is which about the same severity as it has been in the recent past.  She reports fatigue in which she is tired absolutely all the time and has very little energy.  In addition dizziness was reported when she is just sitting around.  This is occasional and not all the time.  She denies any chest pain, lower extremity edema, syncope, PND, orthopnea, or other neurological changes.  PRIOR MEDS  Current Outpatient Medications on File Prior to Visit   Medication Sig Dispense Refill   • aspirin 81 MG tablet Take 81 mg by  mouth Daily.     • Ferrous Sulfate (FE TABS PO) Take 285 mg by mouth Daily.     • metFORMIN (GLUCOPHAGE) 500 MG tablet Take 500 mg by mouth Daily With Breakfast.     • ondansetron (ZOFRAN) 4 MG tablet Take 4 mg by mouth Every 8 (Eight) Hours As Needed for Nausea or Vomiting.     • pantoprazole (PROTONIX) 40 MG EC tablet Take 40 mg by mouth Daily.     • raNITIdine (ZANTAC) 300 MG tablet Daily.     • VENLAFAXINE HCL PO Take 150 mg by mouth 2 (Two) Times a Day.     • vitamin D (ERGOCALCIFEROL) 64443 units capsule capsule 1 (One) Time Per Week.       No current facility-administered medications on file prior to visit.        ALLERGIES  Bee venom; Coconut flavor; Nuts; Latex; and Penicillins    HISTORY  Past Medical History:   Diagnosis Date   • Asthma    • Tachycardia        Social History     Socioeconomic History   • Marital status: Single     Spouse name: Not on file   • Number of children: Not on file   • Years of education: Not on file   • Highest education level: Not on file   Tobacco Use   • Smoking status: Former Smoker     Packs/day: 1.00     Types: Cigarettes     Last attempt to quit: 2015     Years since quittin.5   • Smokeless tobacco: Never Used   Substance and Sexual Activity   • Alcohol use: No   • Drug use: No   • Sexual activity: Defer       Family History   Problem Relation Age of Onset   • Diabetes Mother    • Heart attack Mother    • Bone cancer Maternal Great-Grandmother        Review of Systems   Constitutional: Positive for fatigue (tired all the time). Negative for chills and fever (tired all the time).   HENT: Positive for sinus pressure. Negative for congestion and sore throat.    Eyes: Positive for visual disturbance (glasses).   Respiratory: Positive for shortness of breath (when talking, with activity. about the same). Negative for chest tightness.    Cardiovascular: Positive for palpitations (feelis like ti skips). Negative for chest pain and leg swelling.   Gastrointestinal: Positive  "for abdominal pain (x couple days, went to ER). Negative for blood in stool, constipation, diarrhea, nausea and vomiting.   Endocrine: Negative.  Negative for cold intolerance and heat intolerance.   Genitourinary: Positive for hematuria. Negative for dysuria, frequency and urgency.   Musculoskeletal: Positive for neck pain. Negative for back pain.   Skin: Negative.  Negative for rash and wound.   Allergic/Immunologic: Positive for environmental allergies. Negative for food allergies.   Neurological: Positive for dizziness (sitting) and light-headedness. Negative for syncope.   Hematological: Bruises/bleeds easily (bruises).   Psychiatric/Behavioral: Negative.  Negative for sleep disturbance (denies waking with soa or cp, a lot of nightmares).       Objective     VITALS: /82 (BP Location: Right arm, Patient Position: Sitting)   Pulse 108   Ht 172.7 cm (68\")   Wt (!) 142 kg (314 lb)   SpO2 98%   BMI 47.74 kg/m²     LABS:   Lab Results (most recent)     None          IMAGING:   Us Spleen    Result Date: 6/9/2020  Unremarkable exam.  This report was finalized on 6/9/2020 12:40 PM by Tera Garrett M.D..    Us Liver    Result Date: 6/9/2020  Fatty infiltration of the liver with a 1.7 cm nonspecific lesion in the left lobe of the liver. A CT or MR liver mass protocol is recommended for further characterization.   This report was finalized on 6/9/2020 12:41 PM by Tera Garrett M.D..      EXAM:  Physical Exam   Constitutional: She is oriented to person, place, and time. She appears well-developed and well-nourished.   HENT:   Head: Normocephalic and atraumatic.   Eyes: Pupils are equal, round, and reactive to light. EOM are normal.   Neck: Trachea normal and phonation normal. Neck supple. No JVD present. Carotid bruit is not present. No thyroid mass present.   Cardiovascular: Normal rate, regular rhythm, normal heart sounds and intact distal pulses. Exam reveals no gallop and no friction rub.   No " murmur heard.  Pulses:       Radial pulses are 2+ on the right side, and 2+ on the left side.        Posterior tibial pulses are 2+ on the right side, and 2+ on the left side.   Pulmonary/Chest: Effort normal and breath sounds normal. No respiratory distress. She has no wheezes. She has no rales.   Abdominal: Soft. Bowel sounds are normal. She exhibits no distension and no abdominal bruit. There is no tenderness.   Musculoskeletal: Normal range of motion. She exhibits no edema.   Neurological: She is alert and oriented to person, place, and time. She has normal strength. No cranial nerve deficit or sensory deficit.   Skin: Skin is warm, dry and intact. Capillary refill takes less than 2 seconds. No rash noted.   Psychiatric: She has a normal mood and affect. Her speech is normal and behavior is normal. Judgment and thought content normal. Cognition and memory are normal.   Nursing note and vitals reviewed.      Procedure     ECG 12 Lead  Date/Time: 7/8/2020 11:35 AM  Performed by: Alfonso Swanson APRN  Authorized by: Alfonso Swanson APRN   Comparison: compared with previous ECG from 2/8/2019  Rhythm: sinus tachycardia  Rate: tachycardic  BPM: 101  QRS axis: normal  Comments:  ms  No acute changes               Assessment/Plan    Diagnosis Plan   1. Tachycardia  ECG 12 Lead    labetalol (NORMODYNE) 100 MG tablet   2. Palpitations  labetalol (NORMODYNE) 100 MG tablet   3. SOB (shortness of breath)  albuterol sulfate  (90 Base) MCG/ACT inhaler   1.  Patient's tachycardia and palpitations are relatively well controlled on labetalol.  She they still do occur however they are less frequent.  No medication changes are warranted at this time and will continue to monitor her palpitations.  2.  Patient does have shortness of breath that is not progressive and is stable from previous examination per DELBERT Noriega.  No ischemic evaluation is needed at this time.  3.  Informed of signs and symptoms of ACS  and advised to seek emergent treatment for any new worsening symptoms.  Patient also advised sooner follow-up as needed.  Also advised to follow-up with family doctor as needed  This note is dictated utilizing voice recognition software.  Although this record has been proof read, transcriptional errors may still be present. If questions occur regarding the content of this record please do not hesitate to call our office.    Return in about 6 months (around 1/8/2021), or if symptoms worsen or fail to improve.    Ike was seen today for rapid heart rate.    Diagnoses and all orders for this visit:    Tachycardia  -     ECG 12 Lead  -     labetalol (NORMODYNE) 100 MG tablet; Take 1 tablet by mouth 2 (Two) Times a Day.    Palpitations  -     labetalol (NORMODYNE) 100 MG tablet; Take 1 tablet by mouth 2 (Two) Times a Day.    SOB (shortness of breath)  -     albuterol sulfate  (90 Base) MCG/ACT inhaler; Inhale 2 puffs Every 4 (Four) Hours As Needed for Wheezing or Shortness of Air.        Ike Soria  reports that she quit smoking about 5 years ago. Her smoking use included cigarettes. She smoked 1.00 pack per day. She has never used smokeless tobacco.. I have educated her on the risk of diseases from using tobacco products such as cancer, COPD and heart diease.     I advised her to quit and she is not willing to quit.    I spent 3  minutes counseling the patient.           Patient's Body mass index is 47.74 kg/m². BMI is above normal parameters. Recommendations include: educational material.           MEDS ORDERED DURING VISIT:  New Medications Ordered This Visit   Medications   • labetalol (NORMODYNE) 100 MG tablet     Sig: Take 1 tablet by mouth 2 (Two) Times a Day.     Dispense:  60 tablet     Refill:  6   • albuterol sulfate  (90 Base) MCG/ACT inhaler     Sig: Inhale 2 puffs Every 4 (Four) Hours As Needed for Wheezing or Shortness of Air.     Dispense:  1 inhaler     Refill:  1           This  document has been electronically signed by Alfonso Swanson Jr., APRN  July 13, 2020 22:08

## 2020-07-08 NOTE — PATIENT INSTRUCTIONS
Steps to Quit Smoking  Smoking tobacco is the leading cause of preventable death. It can affect almost every organ in the body. Smoking puts you and people around you at risk for many serious, long-lasting (chronic) diseases. Quitting smoking can be hard, but it is one of the best things that you can do for your health. It is never too late to quit.  How do I get ready to quit?  When you decide to quit smoking, make a plan to help you succeed. Before you quit:  · Pick a date to quit. Set a date within the next 2 weeks to give you time to prepare.  · Write down the reasons why you are quitting. Keep this list in places where you will see it often.  · Tell your family, friends, and co-workers that you are quitting. Their support is important.  · Talk with your doctor about the choices that may help you quit.  · Find out if your health insurance will pay for these treatments.  · Know the people, places, things, and activities that make you want to smoke (triggers). Avoid them.  What first steps can I take to quit smoking?  · Throw away all cigarettes at home, at work, and in your car.  · Throw away the things that you use when you smoke, such as ashtrays and lighters.  · Clean your car. Make sure to empty the ashtray.  · Clean your home, including curtains and carpets.  What can I do to help me quit smoking?  Talk with your doctor about taking medicines and seeing a counselor at the same time. You are more likely to succeed when you do both.  · If you are pregnant or breastfeeding, talk with your doctor about counseling or other ways to quit smoking. Do not take medicine to help you quit smoking unless your doctor tells you to do so.  To quit smoking:  Quit right away  · Quit smoking totally, instead of slowly cutting back on how much you smoke over a period of time.  · Go to counseling. You are more likely to quit if you go to counseling sessions regularly.  Take medicine  You may take medicines to help you quit. Some  medicines need a prescription, and some you can buy over-the-counter. Some medicines may contain a drug called nicotine to replace the nicotine in cigarettes. Medicines may:  · Help you to stop having the desire to smoke (cravings).  · Help to stop the problems that come when you stop smoking (withdrawal symptoms).  Your doctor may ask you to use:  · Nicotine patches, gum, or lozenges.  · Nicotine inhalers or sprays.  · Non-nicotine medicine that is taken by mouth.  Find resources  Find resources and other ways to help you quit smoking and remain smoke-free after you quit. These resources are most helpful when you use them often. They include:  · Online chats with a counselor.  · Phone quitlines.  · Printed self-help materials.  · Support groups or group counseling.  · Text messaging programs.  · Mobile phone apps. Use apps on your mobile phone or tablet that can help you stick to your quit plan. There are many free apps for mobile phones and tablets as well as websites. Examples include Quit Guide from the CDC and smokefree.gov    What things can I do to make it easier to quit?    · Talk to your family and friends. Ask them to support and encourage you.  · Call a phone quitline (0-172-QUITNOW), reach out to support groups, or work with a counselor.  · Ask people who smoke to not smoke around you.  · Avoid places that make you want to smoke, such as:  ? Bars.  ? Parties.  ? Smoke-break areas at work.  · Spend time with people who do not smoke.  · Lower the stress in your life. Stress can make you want to smoke. Try these things to help your stress:  ? Getting regular exercise.  ? Doing deep-breathing exercises.  ? Doing yoga.  ? Meditating.  ? Doing a body scan. To do this, close your eyes, focus on one area of your body at a time from head to toe. Notice which parts of your body are tense. Try to relax the muscles in those areas.  How will I feel when I quit smoking?  Day 1 to 3 weeks  Within the first 24 hours,  you may start to have some problems that come from quitting tobacco. These problems are very bad 2-3 days after you quit, but they do not often last for more than 2-3 weeks. You may get these symptoms:  · Mood swings.  · Feeling restless, nervous, angry, or annoyed.  · Trouble concentrating.  · Dizziness.  · Strong desire for high-sugar foods and nicotine.  · Weight gain.  · Trouble pooping (constipation).  · Feeling like you may vomit (nausea).  · Coughing or a sore throat.  · Changes in how the medicines that you take for other issues work in your body.  · Depression.  · Trouble sleeping (insomnia).  Week 3 and afterward  After the first 2-3 weeks of quitting, you may start to notice more positive results, such as:  · Better sense of smell and taste.  · Less coughing and sore throat.  · Slower heart rate.  · Lower blood pressure.  · Clearer skin.  · Better breathing.  · Fewer sick days.  Quitting smoking can be hard. Do not give up if you fail the first time. Some people need to try a few times before they succeed. Do your best to stick to your quit plan, and talk with your doctor if you have any questions or concerns.  Summary  · Smoking tobacco is the leading cause of preventable death. Quitting smoking can be hard, but it is one of the best things that you can do for your health.  · When you decide to quit smoking, make a plan to help you succeed.  · Quit smoking right away, not slowly over a period of time.  · When you start quitting, seek help from your doctor, family, or friends.  This information is not intended to replace advice given to you by your health care provider. Make sure you discuss any questions you have with your health care provider.  Document Released: 10/14/2010 Document Revised: 03/06/2020 Document Reviewed: 03/07/2020  Elsevier Patient Education © 2020 [employer]  BMI for Adults    Body mass index (BMI) is a number that is calculated from a person's weight and height. BMI may help to  "estimate how much of a person's weight is composed of fat. BMI can help identify those who may be at higher risk for certain medical problems.  How is BMI used with adults?  BMI is used as a screening tool to identify possible weight problems. It is used to check whether a person is obese, overweight, healthy weight, or underweight.  How is BMI calculated?  BMI measures your weight and compares it to your height. This can be done either in English ([place] or metric measurements. Note that charts are available to help you find your BMI quickly and easily without having to do these calculations yourself.  To calculate your BMI in English ([place] measurements, your health care provider will:  1. Measure your weight in pounds (lb).  2. Multiply the number of pounds by 703.  ? For example, for a person who weighs 180 lb, multiply that number by 703, which equals 126,540.  3. Measure your height in inches (in). Then multiply that number by itself to get a measurement called \"inches squared.\"  ? For example, for a person who is 70 in tall, the \"inches squared\" measurement is 70 in x 70 in, which equals 4900 inches squared.  4. Divide the total from Step 2 (number of lb x 703) by the total from Step 3 (inches squared): 126,540 ÷ 4900 = 25.8. This is your BMI.  To calculate your BMI in metric measurements, your health care provider will:  1. Measure your weight in kilograms (kg).  2. Measure your height in meters (m). Then multiply that number by itself to get a measurement called \"meters squared.\"  ? For example, for a person who is 1.75 m tall, the \"meters squared\" measurement is 1.75 m x 1.75 m, which is equal to 3.1 meters squared.  3. Divide the number of kilograms (your weight) by the meters squared number. In this example: 70 ÷ 3.1 = 22.6. This is your BMI.  How is BMI interpreted?  To interpret your results, your health care provider will use BMI charts to identify whether you are underweight, normal weight, " overweight, or obese. The following guidelines will be used:  · Underweight: BMI less than 18.5.  · Normal weight: BMI between 18.5 and 24.9.  · Overweight: BMI between 25 and 29.9.  · Obese: BMI of 30 and above.  Please note:  · Weight includes both fat and muscle, so someone with a muscular build, such as an athlete, may have a BMI that is higher than 24.9. In cases like these, BMI is not an accurate measure of body fat.  · To determine if excess body fat is the cause of a BMI of 25 or higher, further assessments may need to be done by a health care provider.  · BMI is usually interpreted in the same way for men and women.  Why is BMI a useful tool?  BMI is useful in two ways:  · Identifying a weight problem that may be related to a medical condition, or that may increase the risk for medical problems.  · Promoting lifestyle and diet changes in order to reach a healthy weight.  Summary  · Body mass index (BMI) is a number that is calculated from a person's weight and height.  · BMI may help to estimate how much of a person's weight is composed of fat. BMI can help identify those who may be at higher risk for certain medical problems.  · BMI can be measured using English measurements or metric measurements.  · To interpret your results, your health care provider will use BMI charts to identify whether you are underweight, normal weight, overweight, or obese.  This information is not intended to replace advice given to you by your health care provider. Make sure you discuss any questions you have with your health care provider.  Document Released: 08/29/2005 Document Revised: 11/30/2018 Document Reviewed: 10/31/2018  [employer] Patient Education © 2020 [employer]    Acute Coronary Syndrome  Acute coronary syndrome (ACS) is a serious problem in which there is suddenly not enough blood and oxygen reaching the heart. ACS can result in chest pain or a heart attack.  This condition is a medical emergency. If you have  any symptoms of this condition, get help right away.  What are the causes?  This condition may be caused by:  · A buildup of fat and cholesterol inside the arteries (atherosclerosis). This is the most common cause. The buildup (plaque) can cause blood vessels in the heart (coronary arteries) to become narrow or blocked, which reduces blood flow to the heart. Plaque can also break off and lead to a clot, which can block an artery and cause a heart attack or stroke.  · Sudden tightening of the muscles around the coronary arteries (coronary spasm).  · Tearing of a coronary artery (spontaneous coronary artery dissection).  · Very low blood pressure (hypotension).  · An abnormal heartbeat (arrhythmia).  · Other medical conditions that cause a decrease of oxygen to the heart, such as anemiaorrespiratory failure.  · Using cocaine or methamphetamine.  What increases the risk?  The following factors may make you more likely to develop this condition:  · Age. The risk for ACS increases as you get older.  · History of chest pain, heart attack, peripheral artery disease, or stroke.  · Having taken chemotherapy or immune-suppressing medicines.  · Being male.  · Family history of chest pain, heart disease, or stroke.  · Smoking.  · Not exercising enough.  · Being overweight.  · High cholesterol.  · High blood pressure (hypertension).  · Diabetes.  · Excessive alcohol use.  What are the signs or symptoms?  Common symptoms of this condition include:  · Chest pain. The pain may last a long time, or it may stop and come back (recur). It may feel like:  ? Crushing or squeezing.  ? Tightness, pressure, fullness, or heaviness.  · Arm, neck, jaw, or back pain.  · Heartburn or indigestion.  · Shortness of breath.  · Nausea.  · Sudden cold sweats.  · Light-headedness.  · Dizziness or passing out.  · Tiredness (fatigue).  Sometimes there are no symptoms.  How is this diagnosed?  This condition may be diagnosed based on:  · Your medical  history and symptoms.  · Imaging tests, such as:  ? An electrocardiogram (ECG). This measures the heart's electrical activity.  ? X-rays.  ? CT scan.  ? A coronary angiogram. For this test, dye is injected into the heart arteries and then X-rays are taken.  ? Myocardial perfusion imaging. This test shows how well blood flows through your heart muscle.  · Blood tests. These may be repeated at certain time intervals.  · Exercise stress testing.  · Echocardiogram. This is a test that uses sound waves to produce detailed images of the heart.  How is this treated?  Treatment for this condition may include:  · Oxygen therapy.  · Medicines, such as:  ? Antiplatelet medicines and blood-thinning medicines, such as aspirin. These help prevent blood clots.  ? Medicine that dissolves any blood clots (fibrinolytic therapy).  ? Blood pressure medicines.  ? Nitroglycerin. This helps widen blood vessels to improve blood flow.  ? Pain medicine.  ? Cholesterol-lowering medicine.  · Surgery, such as:  ? Coronary angioplasty with stent placement. This involves placing a small piece of metal that looks like mesh or a spring into a narrow coronary artery. This widens the artery and keeps it open.  ? Coronary artery bypass surgery. This involves taking a section of a blood vessel from a different part of your body and placing it on the blocked coronary artery to allow blood to flow around the blockage.  · Cardiac rehabilitation. This is a program that includes exercise training, education, and counseling to help you recover.  Follow these instructions at home:  Eating and drinking  · Eat a heart-healthy diet that includes whole grains, fruits and vegetables, lean proteins, and low-fat or nonfat dairy products.  · Limit how much salt (sodium) you eat as told by your health care provider. Follow instructions from your health care provider about any other eating or drinking restrictions, such as limiting foods that are high in fat and  processed sugars.  · Use healthy cooking methods such as roasting, grilling, broiling, baking, poaching, steaming, or stir-frying.  · Work with a dietitian to follow a heart-healthy eating plan.  Medicines  · Take over-the-counter and prescription medicines only as told by your health care provider.  · Do not take these medicines unless your health care provider approves:  ? Vitamin supplements that contain vitamin A or vitamin E.  ? NSAIDs, such as ibuprofen, naproxen, or celecoxib.  ? Hormone replacement therapy that contains estrogen.  · If you are taking blood thinners:  ? Talk with your health care provider before you take any medicines that contain aspirin or NSAIDs. These medicines increase your risk for dangerous bleeding.  ? Take your medicine exactly as told, at the same time every day.  ? Avoid activities that could cause injury or bruising, and follow instructions about how to prevent falls.  ? Wear a medical alert bracelet, and carry a card that lists what medicines you take.  Activity  · Follow your cardiac rehabilitation program. Do exercises as told by your physical therapist.  · Ask your health care provider what activities and exercises are safe for you. Follow his or her instructions about lifting, driving, or climbing stairs.  Lifestyle  · Do not use any products that contain nicotine or tobacco, such as cigarettes, e-cigarettes, and chewing tobacco. If you need help quitting, ask your health care provider.  · Do not drink alcohol if your health care provider tells you not to drink.  · If you drink alcohol:  ? Limit how much you have to 0-1 drink a day.  ? Be aware of how much alcohol is in your drink. In the [place] one drink equals one 12 oz bottle of beer (355 mL), one 5 oz glass of wine (148 mL), or one 1½ oz glass of hard liquor (44 mL).  · Maintain a healthy weight. If you need to lose weight, work with your health care provider to do so safely.  General instructions  · Tell all the health  care providers who provide care for you about your heart condition, including your dentist. This may affect the medicines or treatment you receive.  · Manage any other health conditions you have, such as hypertension or diabetes. These conditions affect your heart.  · Pay attention to your mental health. You may be at higher risk for depression.  ? Find ways to manage stress.  ? Talk to your health care provider about depression screening and treatment.  · Keep your vaccinations up to date.  ? Get the flu shot (influenza vaccine) every year.  ? Get the pneumococcal vaccine if you are age 65 or older.  · If directed, monitor your blood pressure at home.  · Keep all follow-up visits as told by your health care provider. This is important.  Contact a health care provider if you:  · Feel overwhelmed or sad.  · Have trouble doing your daily activities.  Get help right away if you:  · Have pain in your chest, neck, arm, jaw, stomach, or back that recurs, and:  ? It lasts for more than a few minutes.  ? It is not relieved by taking the medicineyour health care provider prescribed.  · Have unexplained:  ? Heavy sweating.  ? Heartburn or indigestion.  ? Nausea or vomiting.  ? Shortness of breath.  ? Difficulty breathing.  ? Fatigue.  ? Nervousness or anxiety.  ? Weakness.  ? Diarrhea.  ? Dark stools or blood in your stool.  · Have sudden light-headedness or dizziness.  · Have blood pressure that is higher than 180/120.  · Faint.  · Have thoughts about hurting yourself.  These symptoms may represent a serious problem that is an emergency. Do not wait to see if the symptoms will go away. Get medical help right away. Call your local emergency services (911 in the [place] Do not drive yourself to the hospital.   Summary  · Acute coronary syndrome (ACS) is when there is not enough blood and oxygen being supplied to the heart. ACS can result in chest pain or a heart attack.  · Acute coronary syndrome is a medical emergency. If you  have any symptoms of this condition, get help right away.  · Treatment includes medicines and procedures to open the blocked arteries and restore blood flow.  This information is not intended to replace advice given to you by your health care provider. Make sure you discuss any questions you have with your health care provider.  Document Released: 12/18/2006 Document Revised: 12/30/2019 Document Reviewed: 12/30/2019  Elsevier Patient Education © 2020 [employer]

## 2020-07-14 ENCOUNTER — TRANSCRIBE ORDERS (OUTPATIENT)
Dept: INFUSION THERAPY | Facility: HOSPITAL | Age: 20
End: 2020-07-14

## 2020-07-14 DIAGNOSIS — R56.9 SEIZURE (HCC): Primary | ICD-10-CM

## 2020-07-28 ENCOUNTER — APPOINTMENT (OUTPATIENT)
Dept: INFUSION THERAPY | Facility: HOSPITAL | Age: 20
End: 2020-07-28

## 2020-08-11 ENCOUNTER — TRANSCRIBE ORDERS (OUTPATIENT)
Dept: INFUSION THERAPY | Facility: HOSPITAL | Age: 20
End: 2020-08-11

## 2020-08-11 DIAGNOSIS — R56.9 SEIZURES (HCC): Primary | ICD-10-CM

## 2020-09-01 ENCOUNTER — APPOINTMENT (OUTPATIENT)
Dept: INFUSION THERAPY | Facility: HOSPITAL | Age: 20
End: 2020-09-01

## 2020-11-23 DIAGNOSIS — R00.0 TACHYCARDIA: ICD-10-CM

## 2020-11-23 DIAGNOSIS — R00.2 PALPITATIONS: ICD-10-CM

## 2020-11-23 RX ORDER — LABETALOL 100 MG/1
100 TABLET, FILM COATED ORAL 2 TIMES DAILY
Qty: 60 TABLET | Refills: 6 | Status: SHIPPED | OUTPATIENT
Start: 2020-11-23 | End: 2021-07-08 | Stop reason: SDUPTHER

## 2021-06-11 ENCOUNTER — TELEPHONE (OUTPATIENT)
Dept: ONCOLOGY | Facility: CLINIC | Age: 21
End: 2021-06-11

## 2021-06-11 NOTE — TELEPHONE ENCOUNTER
Caller: SLIME    Relationship: [facility]    Best call back number: [phone]    What form or medical record are you requesting: OFFICE NOTE AND LAB FROM 06/03/2020    How would you like to receive the form or medical records (pick-up, mail, fax): FAX  If fax, what is the fax number: [phone]    Timeframe paperwork needed: WHENEVER POSSIBLE    Additional notes: SHE'S FAXING A RELEASE FORM TO FAX# [phone] IN CASE IT'S NECESSARY TO RECEIVE THESE DOCUMENTS

## 2021-07-08 ENCOUNTER — OFFICE VISIT (OUTPATIENT)
Dept: CARDIOLOGY | Facility: CLINIC | Age: 21
End: 2021-07-08

## 2021-07-08 VITALS
HEART RATE: 110 BPM | SYSTOLIC BLOOD PRESSURE: 140 MMHG | WEIGHT: 293 LBS | BODY MASS INDEX: 44.41 KG/M2 | DIASTOLIC BLOOD PRESSURE: 95 MMHG | HEIGHT: 68 IN | OXYGEN SATURATION: 94 %

## 2021-07-08 DIAGNOSIS — R06.02 SOB (SHORTNESS OF BREATH): ICD-10-CM

## 2021-07-08 DIAGNOSIS — R07.89 CHEST PAIN, ATYPICAL: ICD-10-CM

## 2021-07-08 DIAGNOSIS — R00.0 TACHYCARDIA: ICD-10-CM

## 2021-07-08 DIAGNOSIS — I10 ESSENTIAL HYPERTENSION: Primary | ICD-10-CM

## 2021-07-08 DIAGNOSIS — R00.2 PALPITATIONS: ICD-10-CM

## 2021-07-08 PROCEDURE — 93000 ELECTROCARDIOGRAM COMPLETE: CPT | Performed by: NURSE PRACTITIONER

## 2021-07-08 PROCEDURE — 99214 OFFICE O/P EST MOD 30 MIN: CPT | Performed by: NURSE PRACTITIONER

## 2021-07-08 RX ORDER — LABETALOL 200 MG/1
200 TABLET, FILM COATED ORAL 2 TIMES DAILY
Qty: 60 TABLET | Refills: 11 | Status: SHIPPED | OUTPATIENT
Start: 2021-07-08 | End: 2021-10-19 | Stop reason: SDUPTHER

## 2021-07-08 RX ORDER — VENLAFAXINE HYDROCHLORIDE 150 MG/1
150 CAPSULE, EXTENDED RELEASE ORAL DAILY
COMMUNITY
Start: 2021-06-08 | End: 2022-09-01

## 2021-07-08 RX ORDER — VENLAFAXINE HYDROCHLORIDE 75 MG/1
75 TABLET, EXTENDED RELEASE ORAL DAILY
COMMUNITY
Start: 2021-06-08

## 2021-07-08 RX ORDER — BUSPIRONE HYDROCHLORIDE 15 MG/1
15 TABLET ORAL DAILY
COMMUNITY
Start: 2021-06-08

## 2021-07-08 RX ORDER — ARIPIPRAZOLE 15 MG/1
TABLET ORAL
COMMUNITY
Start: 2021-06-08 | End: 2022-09-01

## 2021-07-08 NOTE — PROGRESS NOTES
Subjective     Ike Soria is a 20 y.o. female who presents to day for Chest Pain (Sharp- intermittent), Dizziness, and Rapid Heart Rate (1 yr f/u).    CHIEF COMPLIANT  Chief Complaint   Patient presents with   • Chest Pain     Sharp- intermittent   • Dizziness   • Rapid Heart Rate     1 yr f/u       Active Problems:  Problem List Items Addressed This Visit        Cardiac and Vasculature    Tachycardia    Relevant Medications    labetalol (NORMODYNE) 200 MG tablet    Other Relevant Orders    Adult Stress Echo W/ Cont or Stress Agent if Necessary Per Protocol    Palpitations    Relevant Medications    labetalol (NORMODYNE) 200 MG tablet    Other Relevant Orders    Adult Stress Echo W/ Cont or Stress Agent if Necessary Per Protocol       Pulmonary and Pneumonias    SOB (shortness of breath)    Relevant Orders    Adult Stress Echo W/ Cont or Stress Agent if Necessary Per Protocol      Other Visit Diagnoses     Essential hypertension    -  Primary    Relevant Medications    labetalol (NORMODYNE) 200 MG tablet    Chest pain, atypical        Relevant Orders    Adult Stress Echo W/ Cont or Stress Agent if Necessary Per Protocol      PrOBLEM LIST:   1. Tachycardia  1.1 Holter; ST with symptoms of fatigue  1.2 EKG; ST, normal axis, EPR, minor NSST-T, no acute ST changes noted.   2. Shortness of Breath  2.1 Echo; ef 50-55%, trace TR  3. Chest pain    HPI  HPI  Ms. Evangelista is a 20-year-old female patient who is being followed up today for chest pain and palpitations.  Patient continues to have chronic chest pain that is unchanged and similar to what it was previously.  She says that she developed chest pain approximately 4 times per week that is intermittent in which she describes as a sharp-like pain.  She does have associated shortness of air and tunneling vision at times with the chest pain.  She does say that activity does make it worse but it also occurs while she is at rest.  She says that activity such as walking into  another room does cause her to become dyspneic and developed chest pain.  Patient also reports chronic palpitations which she has a skipping type sensation in her chest.  She reports that she is always tachycardic and her heartbeat so fast.  She says she can even see her chest moving at times.  She does have chronic fatigue where she is tired all the time and for instance the other day she slept for a total of 20 hours.  She does report some intermittent episodes of dizziness that occurs with the chest pain and palpitations.  She does have chronic arterial hypertension as well which her blood pressure is elevated at 140/95 with a heart rate of 110 bpm.  She is currently taking labetalol 100 mg twice daily for her chronic arterial hypertension.  She denies any lower extremity edema, syncope, PND, orthopnea, or other neurological problems.  PRIOR MEDS  Current Outpatient Medications on File Prior to Visit   Medication Sig Dispense Refill   • albuterol sulfate  (90 Base) MCG/ACT inhaler Inhale 2 puffs Every 4 (Four) Hours As Needed for Wheezing or Shortness of Air. 1 inhaler 1   • ARIPiprazole (ABILIFY) 15 MG tablet      • aspirin 81 MG tablet Take 81 mg by mouth Daily.     • busPIRone (BUSPAR) 10 MG tablet      • ondansetron (ZOFRAN) 4 MG tablet Take 4 mg by mouth Every 8 (Eight) Hours As Needed for Nausea or Vomiting.     • VENLAFAXINE HCL PO Take 150 mg by mouth 2 (Two) Times a Day.     • venlafaxine XR (EFFEXOR-XR) 150 MG 24 hr capsule 150 mg Daily.     • venlafaxine XR (EFFEXOR-XR) 75 MG 24 hr capsule 75 mg Daily.     • vitamin D (ERGOCALCIFEROL) 89234 units capsule capsule 1 (One) Time Per Week.     • [DISCONTINUED] labetalol (NORMODYNE) 100 MG tablet Take 1 tablet by mouth 2 (Two) Times a Day. 60 tablet 6   • Ferrous Sulfate (FE TABS PO) Take 285 mg by mouth Daily.     • metFORMIN (GLUCOPHAGE) 500 MG tablet Take 500 mg by mouth Daily With Breakfast.     • pantoprazole (PROTONIX) 40 MG EC tablet Take 40 mg  "by mouth Daily.     • raNITIdine (ZANTAC) 300 MG tablet Daily.       No current facility-administered medications on file prior to visit.       ALLERGIES  Bee venom, Coconut flavor, Nuts, Latex, and Penicillins    HISTORY  Past Medical History:   Diagnosis Date   • Asthma    • Tachycardia        Social History     Socioeconomic History   • Marital status: Single     Spouse name: Not on file   • Number of children: Not on file   • Years of education: Not on file   • Highest education level: Not on file   Tobacco Use   • Smoking status: Former Smoker     Packs/day: 1.00     Types: Cigarettes     Quit date:      Years since quittin.5   • Smokeless tobacco: Never Used   Substance and Sexual Activity   • Alcohol use: No   • Drug use: No   • Sexual activity: Defer       Family History   Problem Relation Age of Onset   • Diabetes Mother    • Heart attack Mother    • Bone cancer Maternal Great-Grandmother        Review of Systems   Constitutional: Positive for fatigue. Negative for chills and unexpected weight change.   HENT: Negative.    Eyes: Negative.    Respiratory: Positive for chest tightness. Negative for shortness of breath.    Cardiovascular: Positive for chest pain and palpitations. Negative for leg swelling.   Gastrointestinal: Negative.    Endocrine: Negative.    Genitourinary: Negative.    Musculoskeletal: Negative.    Skin: Negative.    Allergic/Immunologic: Negative.    Neurological: Positive for dizziness. Negative for syncope and light-headedness.   Hematological: Does not bruise/bleed easily.   Psychiatric/Behavioral: Negative.        Objective     VITALS: /95 (BP Location: Left arm, Patient Position: Sitting)   Pulse 110   Ht 172.7 cm (68\")   Wt (!) 148 kg (327 lb 3.2 oz)   SpO2 94%   BMI 49.75 kg/m²     LABS:   Lab Results (most recent)     None          IMAGING:   No Images in the past 120 days found..    EXAM:  Physical Exam  Vitals and nursing note reviewed.   Constitutional:       " Appearance: She is well-developed.   HENT:      Head: Normocephalic.   Eyes:      Pupils: Pupils are equal, round, and reactive to light.   Neck:      Thyroid: No thyroid mass.      Vascular: No carotid bruit or JVD.      Trachea: Trachea and phonation normal.   Cardiovascular:      Rate and Rhythm: Regular rhythm. Tachycardia present.      Pulses:           Radial pulses are 2+ on the right side and 2+ on the left side.        Posterior tibial pulses are 2+ on the right side and 2+ on the left side.      Heart sounds: Normal heart sounds. No murmur heard.   No friction rub. No gallop.    Pulmonary:      Effort: Pulmonary effort is normal. No respiratory distress.      Breath sounds: Normal breath sounds. No wheezing or rales.   Abdominal:      General: Bowel sounds are normal.      Palpations: Abdomen is soft.   Musculoskeletal:         General: No swelling. Normal range of motion.      Cervical back: Neck supple.   Skin:     General: Skin is warm and dry.      Capillary Refill: Capillary refill takes less than 2 seconds.      Findings: No rash.   Neurological:      Mental Status: She is alert and oriented to person, place, and time.   Psychiatric:         Speech: Speech normal.         Behavior: Behavior normal.         Thought Content: Thought content normal.         Judgment: Judgment normal.         Procedure     ECG 12 Lead    Date/Time: 7/8/2021 1:02 PM  Performed by: Alfonso Swanson APRN  Authorized by: Alfonso Swanson APRN   Comparison: compared with previous ECG from 7/8/2020  Similar to previous ECG  Rhythm: sinus tachycardia  Rate: bradycardic  QRS axis: normal  Other findings: non-specific ST-T wave changes  Comments:  ms  No acute changes               Assessment/Plan    Diagnosis Plan   1. Essential hypertension     2. Tachycardia  Adult Stress Echo W/ Cont or Stress Agent if Necessary Per Protocol    labetalol (NORMODYNE) 200 MG tablet   3. Palpitations  Adult Stress Echo W/ Cont or Stress  Agent if Necessary Per Protocol    labetalol (NORMODYNE) 200 MG tablet   4. SOB (shortness of breath)  Adult Stress Echo W/ Cont or Stress Agent if Necessary Per Protocol   5. Chest pain, atypical  Adult Stress Echo W/ Cont or Stress Agent if Necessary Per Protocol   1.  Patient continues to have  palpitations as well as resting tachycardia.  Due to this I do feel it is appropriate to increase her labetalol to 200 mg daily.  This will also help reduce her blood pressure which is elevated today at 140/95.  I did request patient monitor her heart rate over the next couple weeks and return a log with her heart rates on it.  She does not have a blood pressure cuff at home and cannot monitor her blood pressure.  We did discuss her trying to obtain a blood pressure cuff.  2.  Due to patient's persistent chest pain and shortness of breath that is worse with activity I do feel it is appropriate to have patient undergo a stress echo for further evaluation of ischemia.  3.  Patient does have chronic arterial hypertension which she is on labetalol.  Her blood pressure is elevated today.  As mentioned above we will increase her labetalol which will hopefully bring her blood pressure down to goal.  4.  Informed of signs and symptoms of ACS and advised to seek emergent treatment for any new worsening symptoms.  Patient also advised sooner follow-up as needed.  Also advised to follow-up with family doctor as needed  This note is dictated utilizing voice recognition software.  Although this record has been proof read, transcriptional errors may still be present. If questions occur regarding the content of this record please do not hesitate to call our office.  I have reviewed and confirmed the accuracy of the ROS as documented by the MA/LALITA/RN DELBERT Valdez    Return in about 3 months (around 10/8/2021), or if symptoms worsen or fail to improve.    Diagnoses and all orders for this visit:    1. Essential hypertension  (Primary)    2. Tachycardia  -     Adult Stress Echo W/ Cont or Stress Agent if Necessary Per Protocol; Future  -     labetalol (NORMODYNE) 200 MG tablet; Take 1 tablet by mouth 2 (Two) Times a Day.  Dispense: 60 tablet; Refill: 11    3. Palpitations  -     Adult Stress Echo W/ Cont or Stress Agent if Necessary Per Protocol; Future  -     labetalol (NORMODYNE) 200 MG tablet; Take 1 tablet by mouth 2 (Two) Times a Day.  Dispense: 60 tablet; Refill: 11    4. SOB (shortness of breath)  -     Adult Stress Echo W/ Cont or Stress Agent if Necessary Per Protocol; Future    5. Chest pain, atypical  -     Adult Stress Echo W/ Cont or Stress Agent if Necessary Per Protocol; Future    Other orders  -     ECG 12 Lead               MEDS ORDERED DURING VISIT:  New Medications Ordered This Visit   Medications   • labetalol (NORMODYNE) 200 MG tablet     Sig: Take 1 tablet by mouth 2 (Two) Times a Day.     Dispense:  60 tablet     Refill:  11           This document has been electronically signed by Alfonso Swanson Jr., APRN  July 8, 2021 23:24 EDT

## 2021-07-08 NOTE — PATIENT INSTRUCTIONS
Acute Coronary Syndrome  Acute coronary syndrome (ACS) is a serious problem in which there is suddenly not enough blood and oxygen reaching the heart. ACS can result in chest pain or a heart attack.  This condition is a medical emergency. If you have any symptoms of this condition, get help right away.  What are the causes?  This condition may be caused by:  · A buildup of fat and cholesterol inside the arteries (atherosclerosis). This is the most common cause. The buildup (plaque) can cause blood vessels in the heart (coronary arteries) to become narrow or blocked, which reduces blood flow to the heart. Plaque can also break off and lead to a clot, which can block an artery and cause a heart attack or stroke.  · Sudden tightening of the muscles around the coronary arteries (coronary spasm).  · Tearing of a coronary artery (spontaneous coronary artery dissection).  · Very low blood pressure (hypotension).  · An abnormal heartbeat (arrhythmia).  · Other medical conditions that cause a decrease of oxygen to the heart, such as anemiaorrespiratory failure.  · Using cocaine or methamphetamine.  What increases the risk?  The following factors may make you more likely to develop this condition:  · Age. The risk for ACS increases as you get older.  · History of chest pain, heart attack, peripheral artery disease, or stroke.  · Having taken chemotherapy or immune-suppressing medicines.  · Being male.  · Family history of chest pain, heart disease, or stroke.  · Smoking.  · Not exercising enough.  · Being overweight.  · High cholesterol.  · High blood pressure (hypertension).  · Diabetes.  · Excessive alcohol use.  What are the signs or symptoms?  Common symptoms of this condition include:  · Chest pain. The pain may last a long time, or it may stop and come back (recur). It may feel like:  ? Crushing or squeezing.  ? Tightness, pressure, fullness, or heaviness.  · Arm, neck, jaw, or back pain.  · Heartburn or  indigestion.  · Shortness of breath.  · Nausea.  · Sudden cold sweats.  · Light-headedness.  · Dizziness or passing out.  · Tiredness (fatigue).  Sometimes there are no symptoms.  How is this diagnosed?  This condition may be diagnosed based on:  · Your medical history and symptoms.  · Imaging tests, such as:  ? An electrocardiogram (ECG). This measures the heart's electrical activity.  ? X-rays.  ? CT scan.  ? A coronary angiogram. For this test, dye is injected into the heart arteries and then X-rays are taken.  ? Myocardial perfusion imaging. This test shows how well blood flows through your heart muscle.  · Blood tests. These may be repeated at certain time intervals.  · Exercise stress testing.  · Echocardiogram. This is a test that uses sound waves to produce detailed images of the heart.  How is this treated?  Treatment for this condition may include:  · Oxygen therapy.  · Medicines, such as:  ? Antiplatelet medicines and blood-thinning medicines, such as aspirin. These help prevent blood clots.  ? Medicine that dissolves any blood clots (fibrinolytic therapy).  ? Blood pressure medicines.  ? Nitroglycerin. This helps widen blood vessels to improve blood flow.  ? Pain medicine.  ? Cholesterol-lowering medicine.  · Surgery, such as:  ? Coronary angioplasty with stent placement. This involves placing a small piece of metal that looks like mesh or a spring into a narrow coronary artery. This widens the artery and keeps it open.  ? Coronary artery bypass surgery. This involves taking a section of a blood vessel from a different part of your body and placing it on the blocked coronary artery to allow blood to flow around the blockage.  · Cardiac rehabilitation. This is a program that includes exercise training, education, and counseling to help you recover.  Follow these instructions at home:  Eating and drinking  · Eat a heart-healthy diet that includes whole grains, fruits and vegetables, lean proteins, and  low-fat or nonfat dairy products.  · Limit how much salt (sodium) you eat as told by your health care provider. Follow instructions from your health care provider about any other eating or drinking restrictions, such as limiting foods that are high in fat and processed sugars.  · Use healthy cooking methods such as roasting, grilling, broiling, baking, poaching, steaming, or stir-frying.  · Work with a dietitian to follow a heart-healthy eating plan.  Medicines  · Take over-the-counter and prescription medicines only as told by your health care provider.  · Do not take these medicines unless your health care provider approves:  ? Vitamin supplements that contain vitamin A or vitamin E.  ? NSAIDs, such as ibuprofen, naproxen, or celecoxib.  ? Hormone replacement therapy that contains estrogen.  · If you are taking blood thinners:  ? Talk with your health care provider before you take any medicines that contain aspirin or NSAIDs. These medicines increase your risk for dangerous bleeding.  ? Take your medicine exactly as told, at the same time every day.  ? Avoid activities that could cause injury or bruising, and follow instructions about how to prevent falls.  ? Wear a medical alert bracelet, and carry a card that lists what medicines you take.  Activity  · Follow your cardiac rehabilitation program. Do exercises as told by your physical therapist.  · Ask your health care provider what activities and exercises are safe for you. Follow his or her instructions about lifting, driving, or climbing stairs.  Lifestyle  · Do not use any products that contain nicotine or tobacco, such as cigarettes, e-cigarettes, and chewing tobacco. If you need help quitting, ask your health care provider.  · Do not drink alcohol if your health care provider tells you not to drink.  · If you drink alcohol:  ? Limit how much you have to 0-1 drink a day.  ? Be aware of how much alcohol is in your drink. In the [place] one drink equals one 12 oz  bottle of beer (355 mL), one 5 oz glass of wine (148 mL), or one 1½ oz glass of hard liquor (44 mL).  · Maintain a healthy weight. If you need to lose weight, work with your health care provider to do so safely.  General instructions  · Tell all the health care providers who provide care for you about your heart condition, including your dentist. This may affect the medicines or treatment you receive.  · Manage any other health conditions you have, such as hypertension or diabetes. These conditions affect your heart.  · Pay attention to your mental health. You may be at higher risk for depression.  ? Find ways to manage stress.  ? Talk to your health care provider about depression screening and treatment.  · Keep your vaccinations up to date.  ? Get the flu shot (influenza vaccine) every year.  ? Get the pneumococcal vaccine if you are age 65 or older.  · If directed, monitor your blood pressure at home.  · Keep all follow-up visits as told by your health care provider. This is important.  Contact a health care provider if you:  · Feel overwhelmed or sad.  · Have trouble doing your daily activities.  Get help right away if you:  · Have pain in your chest, neck, arm, jaw, stomach, or back that recurs, and:  ? It lasts for more than a few minutes.  ? It is not relieved by taking the medicineyour health care provider prescribed.  · Have unexplained:  ? Heavy sweating.  ? Heartburn or indigestion.  ? Nausea or vomiting.  ? Shortness of breath.  ? Difficulty breathing.  ? Fatigue.  ? Nervousness or anxiety.  ? Weakness.  ? Diarrhea.  ? Dark stools or blood in your stool.  · Have sudden light-headedness or dizziness.  · Have blood pressure that is higher than 180/120.  · Faint.  · Have thoughts about hurting yourself.  These symptoms may represent a serious problem that is an emergency. Do not wait to see if the symptoms will go away. Get medical help right away. Call your local emergency services (911 in the [place] Do  not drive yourself to the hospital.   Summary  · Acute coronary syndrome (ACS) is when there is not enough blood and oxygen being supplied to the heart. ACS can result in chest pain or a heart attack.  · Acute coronary syndrome is a medical emergency. If you have any symptoms of this condition, get help right away.  · Treatment includes medicines and procedures to open the blocked arteries and restore blood flow.  This information is not intended to replace advice given to you by your health care provider. Make sure you discuss any questions you have with your health care provider.  Document Revised: 05/20/2020 Document Reviewed: 12/30/2019  [email or web address] Patient Education © 2021 [employer]      Hypertension, Adult  Hypertension is another name for high blood pressure. High blood pressure forces your heart to work harder to pump blood. This can cause problems over time.  There are two numbers in a blood pressure reading. There is a top number (systolic) over a bottom number (diastolic). It is best to have a blood pressure that is below 120/80. Healthy choices can help lower your blood pressure, or you may need medicine to help lower it.  What are the causes?  The cause of this condition is not known. Some conditions may be related to high blood pressure.  What increases the risk?  · Smoking.  · Having type 2 diabetes mellitus, high cholesterol, or both.  · Not getting enough exercise or physical activity.  · Being overweight.  · Having too much fat, sugar, calories, or salt (sodium) in your diet.  · Drinking too much alcohol.  · Having long-term (chronic) kidney disease.  · Having a family history of high blood pressure.  · Age. Risk increases with age.  · Race. You may be at higher risk if you are .  · Gender. Men are at higher risk than women before age 45. After age 65, women are at higher risk than men.  · Having obstructive sleep apnea.  · Stress.  What are the signs or symptoms?  · High blood  pressure may not cause symptoms. Very high blood pressure (hypertensive crisis) may cause:  ? Headache.  ? Feelings of worry or nervousness (anxiety).  ? Shortness of breath.  ? Nosebleed.  ? A feeling of being sick to your stomach (nausea).  ? Throwing up (vomiting).  ? Changes in how you see.  ? Very bad chest pain.  ? Seizures.  How is this treated?  · This condition is treated by making healthy lifestyle changes, such as:  ? Eating healthy foods.  ? Exercising more.  ? Drinking less alcohol.  · Your health care provider may prescribe medicine if lifestyle changes are not enough to get your blood pressure under control, and if:  ? Your top number is above 130.  ? Your bottom number is above 80.  · Your personal target blood pressure may vary.  Follow these instructions at home:  Eating and drinking    · If told, follow the DASH eating plan. To follow this plan:  ? Fill one half of your plate at each meal with fruits and vegetables.  ? Fill one fourth of your plate at each meal with whole grains. Whole grains include whole-wheat pasta, brown rice, and whole-grain bread.  ? Eat or drink low-fat dairy products, such as skim milk or low-fat yogurt.  ? Fill one fourth of your plate at each meal with low-fat (lean) proteins. Low-fat proteins include fish, chicken without skin, eggs, beans, and tofu.  ? Avoid fatty meat, cured and processed meat, or chicken with skin.  ? Avoid pre-made or processed food.  · Eat less than 1,500 mg of salt each day.  · Do not drink alcohol if:  ? Your doctor tells you not to drink.  ? You are pregnant, may be pregnant, or are planning to become pregnant.  · If you drink alcohol:  ? Limit how much you use to:  § 0-1 drink a day for women.  § 0-2 drinks a day for men.  ? Be aware of how much alcohol is in your drink. In the [place] one drink equals one 12 oz bottle of beer (355 mL), one 5 oz glass of wine (148 mL), or one 1½ oz glass of hard liquor (44 mL).  Lifestyle    · Work with your  doctor to stay at a healthy weight or to lose weight. Ask your doctor what the best weight is for you.  · Get at least 30 minutes of exercise most days of the week. This may include walking, swimming, or biking.  · Get at least 30 minutes of exercise that strengthens your muscles (resistance exercise) at least 3 days a week. This may include lifting weights or doing Pilates.  · Do not use any products that contain nicotine or tobacco, such as cigarettes, e-cigarettes, and chewing tobacco. If you need help quitting, ask your doctor.  · Check your blood pressure at home as told by your doctor.  · Keep all follow-up visits as told by your doctor. This is important.  Medicines  · Take over-the-counter and prescription medicines only as told by your doctor. Follow directions carefully.  · Do not skip doses of blood pressure medicine. The medicine does not work as well if you skip doses. Skipping doses also puts you at risk for problems.  · Ask your doctor about side effects or reactions to medicines that you should watch for.  Contact a doctor if you:  · Think you are having a reaction to the medicine you are taking.  · Have headaches that keep coming back (recurring).  · Feel dizzy.  · Have swelling in your ankles.  · Have trouble with your vision.  Get help right away if you:  · Get a very bad headache.  · Start to feel mixed up (confused).  · Feel weak or numb.  · Feel faint.  · Have very bad pain in your:  ? Chest.  ? Belly (abdomen).  · Throw up more than once.  · Have trouble breathing.  Summary  · Hypertension is another name for high blood pressure.  · High blood pressure forces your heart to work harder to pump blood.  · For most people, a normal blood pressure is less than 120/80.  · Making healthy choices can help lower blood pressure. If your blood pressure does not get lower with healthy choices, you may need to take medicine.  This information is not intended to replace advice given to you by your health  care provider. Make sure you discuss any questions you have with your health care provider.  Document Revised: 08/28/2019 Document Reviewed: 08/28/2019  [employer] Patient Education © 2021 [employer]      Palpitations  Palpitations are feelings that your heartbeat is not normal. Your heartbeat may feel like it is:  · Uneven.  · Faster than normal.  · Fluttering.  · Skipping a beat.  This is usually not a serious problem. In some cases, you may need tests to rule out any serious problems.  Follow these instructions at home:  Pay attention to any changes in your condition. Take these actions to help manage your symptoms:  Eating and drinking  · Avoid:  ? Coffee, tea, soft drinks, and energy drinks.  ? Chocolate.  ? Alcohol.  ? Diet pills.  Lifestyle    · Try to lower your stress. These things can help you relax:  ? Yoga.  ? Deep breathing and meditation.  ? Exercise.  ? Using words and images to create positive thoughts (guided imagery).  ? Using your mind to control things in your body (biofeedback).  · Do not use drugs.  · Get plenty of rest and sleep. Keep a regular bed time.  General instructions    · Take over-the-counter and prescription medicines only as told by your doctor.  · Do not use any products that contain nicotine or tobacco, such as cigarettes and e-cigarettes. If you need help quitting, ask your doctor.  · Keep all follow-up visits as told by your doctor. This is important. You may need more tests if palpitations do not go away or get worse.  Contact a doctor if:  · Your symptoms last more than 24 hours.  · Your symptoms occur more often.  Get help right away if you:  · Have chest pain.  · Feel short of breath.  · Have a very bad headache.  · Feel dizzy.  · Pass out (faint).  Summary  · Palpitations are feelings that your heartbeat is uneven or faster than normal. It may feel like your heart is fluttering or skipping a beat.  · Avoid food and drinks that may cause palpitations. These include  caffeine, chocolate, and alcohol.  · Try to lower your stress. Do not smoke or use drugs.  · Get help right away if you faint or have chest pain, shortness of breath, a severe headache, or dizziness.  This information is not intended to replace advice given to you by your health care provider. Make sure you discuss any questions you have with your health care provider.  Document Revised: 01/30/2019 Document Reviewed: 01/30/2019  [employer] Patient Education © 2021 [employer]

## 2021-08-26 ENCOUNTER — HOSPITAL ENCOUNTER (OUTPATIENT)
Dept: CARDIOLOGY | Facility: HOSPITAL | Age: 21
Discharge: HOME | End: 2021-08-26
Admitting: NURSE PRACTITIONER

## 2021-08-26 DIAGNOSIS — R00.0 TACHYCARDIA: ICD-10-CM

## 2021-08-26 DIAGNOSIS — R07.89 CHEST PAIN, ATYPICAL: ICD-10-CM

## 2021-08-26 DIAGNOSIS — R00.2 PALPITATIONS: ICD-10-CM

## 2021-08-26 DIAGNOSIS — R06.02 SOB (SHORTNESS OF BREATH): ICD-10-CM

## 2021-08-26 PROCEDURE — 93320 DOPPLER ECHO COMPLETE: CPT

## 2021-08-26 PROCEDURE — 93325 DOPPLER ECHO COLOR FLOW MAPG: CPT | Performed by: INTERNAL MEDICINE

## 2021-08-26 PROCEDURE — 93325 DOPPLER ECHO COLOR FLOW MAPG: CPT

## 2021-08-26 PROCEDURE — 93320 DOPPLER ECHO COMPLETE: CPT | Performed by: INTERNAL MEDICINE

## 2021-08-26 PROCEDURE — 93351 STRESS TTE COMPLETE: CPT | Performed by: INTERNAL MEDICINE

## 2021-08-26 PROCEDURE — 25010000002 DOBUTAMINE PER 250 MG: Performed by: INTERNAL MEDICINE

## 2021-08-26 PROCEDURE — 25010000002 ATROPINE PER 0.01 MG: Performed by: INTERNAL MEDICINE

## 2021-08-26 PROCEDURE — 93351 STRESS TTE COMPLETE: CPT

## 2021-08-26 RX ORDER — ATROPINE SULFATE 1 MG/ML
0.5 INJECTION, SOLUTION INTRAMUSCULAR; INTRAVENOUS; SUBCUTANEOUS ONCE
Status: COMPLETED | OUTPATIENT
Start: 2021-08-26 | End: 2021-08-26

## 2021-08-26 RX ORDER — DOBUTAMINE HYDROCHLORIDE 200 MG/100ML
10 INJECTION INTRAVENOUS
Status: COMPLETED | OUTPATIENT
Start: 2021-08-26 | End: 2021-08-26

## 2021-08-26 RX ADMIN — ATROPINE SULFATE 0.5 MG: 1 INJECTION, SOLUTION INTRAMUSCULAR; INTRAVENOUS; SUBCUTANEOUS at 15:27

## 2021-08-26 RX ADMIN — DOBUTAMINE HYDROCHLORIDE 10 MCG/KG/MIN: 200 INJECTION INTRAVENOUS at 15:02

## 2021-08-29 LAB
BH CV STRESS DOSE DOBUTAMINE STAGE 1: 10
BH CV STRESS DURATION MIN STAGE 1: 2
BH CV STRESS DURATION SEC STAGE 1: 0
BH CV STRESS PROTOCOL 1: NORMAL
BH CV STRESS RECOVERY BP: NORMAL MMHG
BH CV STRESS RECOVERY HR: 118 BPM
BH CV STRESS STAGE 1: 1
MAXIMAL PREDICTED HEART RATE: 198 BPM
PERCENT MAX PREDICTED HR: 84.85 %
STRESS BASELINE BP: NORMAL MMHG
STRESS BASELINE HR: 101 BPM
STRESS PERCENT HR: 100 %
STRESS POST EXERCISE DUR MIN: 9 MIN
STRESS POST EXERCISE DUR SEC: 42 SEC
STRESS POST PEAK BP: NORMAL MMHG
STRESS POST PEAK HR: 168 BPM
STRESS TARGET HR: 168 BPM

## 2021-08-30 ENCOUNTER — TELEPHONE (OUTPATIENT)
Dept: CARDIOLOGY | Facility: CLINIC | Age: 21
End: 2021-08-30

## 2021-08-30 NOTE — TELEPHONE ENCOUNTER
LVM for pt regarding stress results.  To be reviewed at office visit upon next follow up.          ----- Message from Belinda Cespedes MA sent at 8/30/2021 10:35 AM EDT -----    ----- Message -----  From: Alfonso Swanson APRN  Sent: 8/30/2021  10:24 AM EDT  To: Belinda Cespedes MA    No acute findings on stress echo.  Keep follow-up.  ----- Message -----  From: Yuriy Ly MD  Sent: 8/29/2021   6:20 PM EDT  To: DELBERT Valdez

## 2021-10-19 ENCOUNTER — OFFICE VISIT (OUTPATIENT)
Dept: CARDIOLOGY | Facility: CLINIC | Age: 21
End: 2021-10-19

## 2021-10-19 VITALS
HEIGHT: 68 IN | OXYGEN SATURATION: 94 % | HEART RATE: 110 BPM | SYSTOLIC BLOOD PRESSURE: 144 MMHG | BODY MASS INDEX: 44.41 KG/M2 | WEIGHT: 293 LBS | DIASTOLIC BLOOD PRESSURE: 94 MMHG

## 2021-10-19 DIAGNOSIS — I10 ESSENTIAL HYPERTENSION: ICD-10-CM

## 2021-10-19 DIAGNOSIS — R06.02 SOB (SHORTNESS OF BREATH): ICD-10-CM

## 2021-10-19 DIAGNOSIS — R00.0 TACHYCARDIA: Primary | ICD-10-CM

## 2021-10-19 DIAGNOSIS — R07.89 CHEST PAIN, ATYPICAL: ICD-10-CM

## 2021-10-19 DIAGNOSIS — R00.2 PALPITATIONS: ICD-10-CM

## 2021-10-19 PROCEDURE — 99214 OFFICE O/P EST MOD 30 MIN: CPT | Performed by: NURSE PRACTITIONER

## 2021-10-19 RX ORDER — OLMESARTAN MEDOXOMIL 20 MG/1
20 TABLET ORAL DAILY
Qty: 30 TABLET | Refills: 6 | Status: SHIPPED | OUTPATIENT
Start: 2021-10-19 | End: 2022-03-30 | Stop reason: SDUPTHER

## 2021-10-19 RX ORDER — METOPROLOL SUCCINATE 50 MG/1
50 TABLET, EXTENDED RELEASE ORAL DAILY
Qty: 90 TABLET | Refills: 3 | Status: SHIPPED | OUTPATIENT
Start: 2021-10-19 | End: 2022-03-30 | Stop reason: SDUPTHER

## 2021-10-19 NOTE — PATIENT INSTRUCTIONS
Acute Coronary Syndrome  Acute coronary syndrome (ACS) is a serious problem in which there is suddenly not enough blood and oxygen reaching the heart. ACS can result in chest pain or a heart attack.  This condition is a medical emergency. If you have any symptoms of this condition, get help right away.  What are the causes?  This condition may be caused by:  · A buildup of fat and cholesterol inside the arteries (atherosclerosis). This is the most common cause. The buildup (plaque) can cause blood vessels in the heart (coronary arteries) to become narrow or blocked, which reduces blood flow to the heart. Plaque can also break off and lead to a clot, which can block an artery and cause a heart attack or stroke.  · Sudden tightening of the muscles around the coronary arteries (coronary spasm).  · Tearing of a coronary artery (spontaneous coronary artery dissection).  · Very low blood pressure (hypotension).  · An abnormal heartbeat (arrhythmia).  · Other medical conditions that cause a decrease of oxygen to the heart, such as anemiaorrespiratory failure.  · Using cocaine or methamphetamine.  What increases the risk?  The following factors may make you more likely to develop this condition:  · Age. The risk for ACS increases as you get older.  · History of chest pain, heart attack, peripheral artery disease, or stroke.  · Having taken chemotherapy or immune-suppressing medicines.  · Being male.  · Family history of chest pain, heart disease, or stroke.  · Smoking.  · Not exercising enough.  · Being overweight.  · High cholesterol.  · High blood pressure (hypertension).  · Diabetes.  · Excessive alcohol use.  What are the signs or symptoms?  Common symptoms of this condition include:  · Chest pain. The pain may last a long time, or it may stop and come back (recur). It may feel like:  ? Crushing or squeezing.  ? Tightness, pressure, fullness, or heaviness.  · Arm, neck, jaw, or back pain.  · Heartburn or  indigestion.  · Shortness of breath.  · Nausea.  · Sudden cold sweats.  · Light-headedness.  · Dizziness or passing out.  · Tiredness (fatigue).  Sometimes there are no symptoms.  How is this diagnosed?  This condition may be diagnosed based on:  · Your medical history and symptoms.  · Imaging tests, such as:  ? An electrocardiogram (ECG). This measures the heart's electrical activity.  ? X-rays.  ? CT scan.  ? A coronary angiogram. For this test, dye is injected into the heart arteries and then X-rays are taken.  ? Myocardial perfusion imaging. This test shows how well blood flows through your heart muscle.  · Blood tests. These may be repeated at certain time intervals.  · Exercise stress testing.  · Echocardiogram. This is a test that uses sound waves to produce detailed images of the heart.  How is this treated?  Treatment for this condition may include:  · Oxygen therapy.  · Medicines, such as:  ? Antiplatelet medicines and blood-thinning medicines, such as aspirin. These help prevent blood clots.  ? Medicine that dissolves any blood clots (fibrinolytic therapy).  ? Blood pressure medicines.  ? Nitroglycerin. This helps widen blood vessels to improve blood flow.  ? Pain medicine.  ? Cholesterol-lowering medicine.  · Surgery, such as:  ? Coronary angioplasty with stent placement. This involves placing a small piece of metal that looks like mesh or a spring into a narrow coronary artery. This widens the artery and keeps it open.  ? Coronary artery bypass surgery. This involves taking a section of a blood vessel from a different part of your body and placing it on the blocked coronary artery to allow blood to flow around the blockage.  · Cardiac rehabilitation. This is a program that includes exercise training, education, and counseling to help you recover.  Follow these instructions at home:  Eating and drinking  · Eat a heart-healthy diet that includes whole grains, fruits and vegetables, lean proteins, and  low-fat or nonfat dairy products.  · Limit how much salt (sodium) you eat as told by your health care provider. Follow instructions from your health care provider about any other eating or drinking restrictions, such as limiting foods that are high in fat and processed sugars.  · Use healthy cooking methods such as roasting, grilling, broiling, baking, poaching, steaming, or stir-frying.  · Work with a dietitian to follow a heart-healthy eating plan.  Medicines  · Take over-the-counter and prescription medicines only as told by your health care provider.  · Do not take these medicines unless your health care provider approves:  ? Vitamin supplements that contain vitamin A or vitamin E.  ? NSAIDs, such as ibuprofen, naproxen, or celecoxib.  ? Hormone replacement therapy that contains estrogen.  · If you are taking blood thinners:  ? Talk with your health care provider before you take any medicines that contain aspirin or NSAIDs. These medicines increase your risk for dangerous bleeding.  ? Take your medicine exactly as told, at the same time every day.  ? Avoid activities that could cause injury or bruising, and follow instructions about how to prevent falls.  ? Wear a medical alert bracelet, and carry a card that lists what medicines you take.  Activity  · Follow your cardiac rehabilitation program. Do exercises as told by your physical therapist.  · Ask your health care provider what activities and exercises are safe for you. Follow his or her instructions about lifting, driving, or climbing stairs.  Lifestyle  · Do not use any products that contain nicotine or tobacco, such as cigarettes, e-cigarettes, and chewing tobacco. If you need help quitting, ask your health care provider.  · Do not drink alcohol if your health care provider tells you not to drink.  · If you drink alcohol:  ? Limit how much you have to 0-1 drink a day.  ? Be aware of how much alcohol is in your drink. In the [place] one drink equals one 12 oz  bottle of beer (355 mL), one 5 oz glass of wine (148 mL), or one 1½ oz glass of hard liquor (44 mL).  · Maintain a healthy weight. If you need to lose weight, work with your health care provider to do so safely.  General instructions  · Tell all the health care providers who provide care for you about your heart condition, including your dentist. This may affect the medicines or treatment you receive.  · Manage any other health conditions you have, such as hypertension or diabetes. These conditions affect your heart.  · Pay attention to your mental health. You may be at higher risk for depression.  ? Find ways to manage stress.  ? Talk to your health care provider about depression screening and treatment.  · Keep your vaccinations up to date.  ? Get the flu shot (influenza vaccine) every year.  ? Get the pneumococcal vaccine if you are age 65 or older.  · If directed, monitor your blood pressure at home.  · Keep all follow-up visits as told by your health care provider. This is important.  Contact a health care provider if you:  · Feel overwhelmed or sad.  · Have trouble doing your daily activities.  Get help right away if you:  · Have pain in your chest, neck, arm, jaw, stomach, or back that recurs, and:  ? It lasts for more than a few minutes.  ? It is not relieved by taking the medicineyour health care provider prescribed.  · Have unexplained:  ? Heavy sweating.  ? Heartburn or indigestion.  ? Nausea or vomiting.  ? Shortness of breath.  ? Difficulty breathing.  ? Fatigue.  ? Nervousness or anxiety.  ? Weakness.  ? Diarrhea.  ? Dark stools or blood in your stool.  · Have sudden light-headedness or dizziness.  · Have blood pressure that is higher than 180/120.  · Faint.  · Have thoughts about hurting yourself.  These symptoms may represent a serious problem that is an emergency. Do not wait to see if the symptoms will go away. Get medical help right away. Call your local emergency services (911 in the [place] Do  not drive yourself to the hospital.   Summary  · Acute coronary syndrome (ACS) is when there is not enough blood and oxygen being supplied to the heart. ACS can result in chest pain or a heart attack.  · Acute coronary syndrome is a medical emergency. If you have any symptoms of this condition, get help right away.  · Treatment includes medicines and procedures to open the blocked arteries and restore blood flow.  This information is not intended to replace advice given to you by your health care provider. Make sure you discuss any questions you have with your health care provider.  Document Revised: 05/20/2020 Document Reviewed: 12/30/2019  [employer] Patient Education © 2021 [employer]      Hypertension, Adult  Hypertension is another name for high blood pressure. High blood pressure forces your heart to work harder to pump blood. This can cause problems over time.  There are two numbers in a blood pressure reading. There is a top number (systolic) over a bottom number (diastolic). It is best to have a blood pressure that is below 120/80. Healthy choices can help lower your blood pressure, or you may need medicine to help lower it.  What are the causes?  The cause of this condition is not known. Some conditions may be related to high blood pressure.  What increases the risk?  · Smoking.  · Having type 2 diabetes mellitus, high cholesterol, or both.  · Not getting enough exercise or physical activity.  · Being overweight.  · Having too much fat, sugar, calories, or salt (sodium) in your diet.  · Drinking too much alcohol.  · Having long-term (chronic) kidney disease.  · Having a family history of high blood pressure.  · Age. Risk increases with age.  · Race. You may be at higher risk if you are .  · Gender. Men are at higher risk than women before age 45. After age 65, women are at higher risk than men.  · Having obstructive sleep apnea.  · Stress.  What are the signs or symptoms?  · High blood  pressure may not cause symptoms. Very high blood pressure (hypertensive crisis) may cause:  ? Headache.  ? Feelings of worry or nervousness (anxiety).  ? Shortness of breath.  ? Nosebleed.  ? A feeling of being sick to your stomach (nausea).  ? Throwing up (vomiting).  ? Changes in how you see.  ? Very bad chest pain.  ? Seizures.  How is this treated?  · This condition is treated by making healthy lifestyle changes, such as:  ? Eating healthy foods.  ? Exercising more.  ? Drinking less alcohol.  · Your health care provider may prescribe medicine if lifestyle changes are not enough to get your blood pressure under control, and if:  ? Your top number is above 130.  ? Your bottom number is above 80.  · Your personal target blood pressure may vary.  Follow these instructions at home:  Eating and drinking    · If told, follow the DASH eating plan. To follow this plan:  ? Fill one half of your plate at each meal with fruits and vegetables.  ? Fill one fourth of your plate at each meal with whole grains. Whole grains include whole-wheat pasta, brown rice, and whole-grain bread.  ? Eat or drink low-fat dairy products, such as skim milk or low-fat yogurt.  ? Fill one fourth of your plate at each meal with low-fat (lean) proteins. Low-fat proteins include fish, chicken without skin, eggs, beans, and tofu.  ? Avoid fatty meat, cured and processed meat, or chicken with skin.  ? Avoid pre-made or processed food.  · Eat less than 1,500 mg of salt each day.  · Do not drink alcohol if:  ? Your doctor tells you not to drink.  ? You are pregnant, may be pregnant, or are planning to become pregnant.  · If you drink alcohol:  ? Limit how much you use to:  § 0-1 drink a day for women.  § 0-2 drinks a day for men.  ? Be aware of how much alcohol is in your drink. In the [place] one drink equals one 12 oz bottle of beer (355 mL), one 5 oz glass of wine (148 mL), or one 1½ oz glass of hard liquor (44 mL).    Lifestyle    · Work with your  doctor to stay at a healthy weight or to lose weight. Ask your doctor what the best weight is for you.  · Get at least 30 minutes of exercise most days of the week. This may include walking, swimming, or biking.  · Get at least 30 minutes of exercise that strengthens your muscles (resistance exercise) at least 3 days a week. This may include lifting weights or doing Pilates.  · Do not use any products that contain nicotine or tobacco, such as cigarettes, e-cigarettes, and chewing tobacco. If you need help quitting, ask your doctor.  · Check your blood pressure at home as told by your doctor.  · Keep all follow-up visits as told by your doctor. This is important.    Medicines  · Take over-the-counter and prescription medicines only as told by your doctor. Follow directions carefully.  · Do not skip doses of blood pressure medicine. The medicine does not work as well if you skip doses. Skipping doses also puts you at risk for problems.  · Ask your doctor about side effects or reactions to medicines that you should watch for.  Contact a doctor if you:  · Think you are having a reaction to the medicine you are taking.  · Have headaches that keep coming back (recurring).  · Feel dizzy.  · Have swelling in your ankles.  · Have trouble with your vision.  Get help right away if you:  · Get a very bad headache.  · Start to feel mixed up (confused).  · Feel weak or numb.  · Feel faint.  · Have very bad pain in your:  ? Chest.  ? Belly (abdomen).  · Throw up more than once.  · Have trouble breathing.  Summary  · Hypertension is another name for high blood pressure.  · High blood pressure forces your heart to work harder to pump blood.  · For most people, a normal blood pressure is less than 120/80.  · Making healthy choices can help lower blood pressure. If your blood pressure does not get lower with healthy choices, you may need to take medicine.  This information is not intended to replace advice given to you by your health  care provider. Make sure you discuss any questions you have with your health care provider.  Document Revised: 08/28/2019 Document Reviewed: 08/28/2019  [employer] Patient Education © 2021 [employer]      Palpitations  Palpitations are feelings that your heartbeat is not normal. Your heartbeat may feel like it is:  · Uneven.  · Faster than normal.  · Fluttering.  · Skipping a beat.  This is usually not a serious problem. In some cases, you may need tests to rule out any serious problems.  Follow these instructions at home:  Pay attention to any changes in your condition. Take these actions to help manage your symptoms:  Eating and drinking  · Avoid:  ? Coffee, tea, soft drinks, and energy drinks.  ? Chocolate.  ? Alcohol.  ? Diet pills.  Lifestyle    · Try to lower your stress. These things can help you relax:  ? Yoga.  ? Deep breathing and meditation.  ? Exercise.  ? Using words and images to create positive thoughts (guided imagery).  ? Using your mind to control things in your body (biofeedback).  · Do not use drugs.  · Get plenty of rest and sleep. Keep a regular bed time.    General instructions    · Take over-the-counter and prescription medicines only as told by your doctor.  · Do not use any products that contain nicotine or tobacco, such as cigarettes and e-cigarettes. If you need help quitting, ask your doctor.  · Keep all follow-up visits as told by your doctor. This is important. You may need more tests if palpitations do not go away or get worse.    Contact a doctor if:  · Your symptoms last more than 24 hours.  · Your symptoms occur more often.  Get help right away if you:  · Have chest pain.  · Feel short of breath.  · Have a very bad headache.  · Feel dizzy.  · Pass out (faint).  Summary  · Palpitations are feelings that your heartbeat is uneven or faster than normal. It may feel like your heart is fluttering or skipping a beat.  · Avoid food and drinks that may cause palpitations. These include  caffeine, chocolate, and alcohol.  · Try to lower your stress. Do not smoke or use drugs.  · Get help right away if you faint or have chest pain, shortness of breath, a severe headache, or dizziness.  This information is not intended to replace advice given to you by your health care provider. Make sure you discuss any questions you have with your health care provider.  Document Revised: 01/30/2019 Document Reviewed: 01/30/2019  [employer] Patient Education © 2021 [employer]

## 2021-10-19 NOTE — PROGRESS NOTES
Subjective     Ike Soria is a 21 y.o. female who presents to day for Chest Pain (presents for stress echo f/u), Rapid Heart Rate, Palpitations, and Hypertension.    CHIEF COMPLIANT  Chief Complaint   Patient presents with   • Chest Pain     presents for stress echo f/u   • Rapid Heart Rate   • Palpitations   • Hypertension       Active Problems:  Problem List Items Addressed This Visit        Cardiac and Vasculature    Tachycardia - Primary    Palpitations    Relevant Medications    metoprolol succinate XL (TOPROL-XL) 50 MG 24 hr tablet       Pulmonary and Pneumonias    SOB (shortness of breath)      Other Visit Diagnoses     Chest pain, atypical        Essential hypertension        Relevant Medications    metoprolol succinate XL (TOPROL-XL) 50 MG 24 hr tablet    olmesartan (Benicar) 20 MG tablet      PrOBLEM LIST:   1. Tachycardia  1.1 Holter; ST with symptoms of fatigue  1.2 EKG; ST, normal axis, EPR, minor NSST-T, no acute ST changes noted.   2. Shortness of Breath  2.1 stress Echo 8/21: No EKG evidence of ischemia with echocardiographic images somewhat equivocal  3. Chest pain    HPI  HPI  Ms. Soria is a 21-year-old female patient who is being followed up today for tachycardia, shortness of breath, and chest pain.  She does report continuation of her chronic chest pain that is unchanged and similar to what it was previously.  She says she developed chest pain that occurs intermittently multiple times a week in which she describes as a sharp-like sensation.  She does have associated shortness of breath.  Activity does make it worse but it also occurs with rest.  She also has palpitations where she has a racing or skipping-like sensation in her ches.  She reports that her heart races.  She also has fatigue where she stays tired.  She does have associated dizziness and lightheadedness with her tachycardia.  She does experience dyspnea at rest and with exertion.  She did go under a stress echocardiogram that was  unremarkable.  We did go over this in detail and she denied any questions.  Her blood pressure is elevated today at 144/94.  She is currently on labetalol for chronic arterial hypertension.  She denies any syncope, PND, orthopnea, or strokelike symptoms.  PRIOR MEDS  Current Outpatient Medications on File Prior to Visit   Medication Sig Dispense Refill   • albuterol sulfate  (90 Base) MCG/ACT inhaler Inhale 2 puffs Every 4 (Four) Hours As Needed for Wheezing or Shortness of Air. 1 inhaler 1   • ARIPiprazole (ABILIFY) 15 MG tablet      • aspirin 81 MG tablet Take 81 mg by mouth Daily.     • busPIRone (BUSPAR) 10 MG tablet 10 mg 2 (Two) Times a Day.     • Ferrous Sulfate (FE TABS PO) Take 285 mg by mouth 3 (Three) Times a Day.     • venlafaxine 75 MG tablet sustained-release 24 hour 24 hr tablet 75 mg Daily.     • venlafaxine XR (EFFEXOR-XR) 150 MG 24 hr capsule 150 mg Daily.       No current facility-administered medications on file prior to visit.       ALLERGIES  Bee venom, Blueberry flavor, Coconut flavor, Nuts, Latex, and Penicillins    HISTORY  Past Medical History:   Diagnosis Date   • Asthma    • Tachycardia        Social History     Socioeconomic History   • Marital status: Single   Tobacco Use   • Smoking status: Current Every Day Smoker     Packs/day: 1.00     Years: 7.00     Pack years: 7.00     Types: Cigarettes   • Smokeless tobacco: Never Used   Substance and Sexual Activity   • Alcohol use: No   • Drug use: No   • Sexual activity: Defer       Family History   Problem Relation Age of Onset   • Diabetes Mother    • Heart attack Mother    • Bone cancer Maternal Great-Grandmother        Review of Systems   Constitutional: Positive for fatigue. Negative for chills, diaphoresis and fever.   HENT: Positive for hearing loss (L ear).    Eyes: Negative.  Negative for visual disturbance.   Respiratory: Positive for shortness of breath (with actvity and times at rest). Negative for apnea, cough, chest  "tightness and wheezing.    Cardiovascular: Positive for chest pain (radiates to L shoulder), palpitations (occas skip and racing) and leg swelling (R foot).   Gastrointestinal: Positive for nausea and vomiting. Negative for abdominal pain, constipation and diarrhea.        EGD scheduled with Dr Spaulding   Endocrine: Negative.    Genitourinary: Negative.  Negative for hematuria.   Musculoskeletal: Positive for arthralgias (hip) and back pain. Negative for myalgias and neck pain.   Skin: Negative.    Allergic/Immunologic: Negative.    Neurological: Positive for dizziness (associated with tachycardia and quick movement), numbness and headaches. Negative for syncope, weakness and light-headedness.   Hematological: Negative.  Does not bruise/bleed easily.   Psychiatric/Behavioral: Negative.  Negative for sleep disturbance.       Objective     VITALS: /94 (BP Location: Left arm, Patient Position: Sitting)   Pulse 110   Ht 172.7 cm (67.99\")   Wt (!) 149 kg (328 lb 3.2 oz)   SpO2 94%   BMI 49.91 kg/m²     LABS:   Lab Results (most recent)     None          IMAGING:   No Images in the past 120 days found..    EXAM:  Physical Exam  Vitals and nursing note reviewed.   Constitutional:       Appearance: She is well-developed.   HENT:      Head: Normocephalic.   Eyes:      Pupils: Pupils are equal, round, and reactive to light.   Neck:      Thyroid: No thyroid mass.      Vascular: No carotid bruit or JVD.      Trachea: Trachea and phonation normal.   Cardiovascular:      Rate and Rhythm: Normal rate and regular rhythm.      Pulses:           Radial pulses are 2+ on the right side and 2+ on the left side.        Posterior tibial pulses are 2+ on the right side and 2+ on the left side.      Heart sounds: Normal heart sounds. No murmur heard.  No friction rub. No gallop.    Pulmonary:      Effort: Pulmonary effort is normal. No respiratory distress.      Breath sounds: Normal breath sounds. No wheezing or rales. "   Abdominal:      General: Bowel sounds are normal.      Palpations: Abdomen is soft.   Musculoskeletal:         General: Swelling present. Normal range of motion.      Cervical back: Neck supple.   Skin:     General: Skin is warm and dry.      Capillary Refill: Capillary refill takes less than 2 seconds.      Findings: No rash.   Neurological:      Mental Status: She is alert and oriented to person, place, and time.   Psychiatric:         Speech: Speech normal.         Behavior: Behavior normal.         Thought Content: Thought content normal.         Judgment: Judgment normal.         Procedure   Procedures       Assessment/Plan    Diagnosis Plan   1. Tachycardia     2. Palpitations  metoprolol succinate XL (TOPROL-XL) 50 MG 24 hr tablet   3. SOB (shortness of breath)     4. Chest pain, atypical     5. Essential hypertension  metoprolol succinate XL (TOPROL-XL) 50 MG 24 hr tablet    olmesartan (Benicar) 20 MG tablet   1.  Due to patient's palpitations and tachycardia which does not seem to be well controlled on the labetalol I would like to switch her to metoprolol to see if we can have a better reduction in her symptoms.  2.  Due to patient's essential hypertension would also like to place her on olmesartan 20 mg daily to see if we can better control her blood pressure as well.  She will monitor her blood pressure on a routine basis report any significant highs or lows.  3.  Informed of signs and symptoms of ACS and advised to seek emergent treatment for any new worsening symptoms.  Patient also advised sooner follow-up as needed.  Also advised to follow-up with family doctor as needed  This note is dictated utilizing voice recognition software.  Although this record has been proof read, transcriptional errors may still be present. If questions occur regarding the content of this record please do not hesitate to call our office.  I have reviewed and confirmed the accuracy of the ROS as documented by the MA/LPN/RN  DELBERT Valdez    Return in about 3 months (around 1/19/2022), or if symptoms worsen or fail to improve.    Diagnoses and all orders for this visit:    1. Tachycardia (Primary)    2. Palpitations  -     metoprolol succinate XL (TOPROL-XL) 50 MG 24 hr tablet; Take 1 tablet by mouth Daily.  Dispense: 90 tablet; Refill: 3    3. SOB (shortness of breath)    4. Chest pain, atypical    5. Essential hypertension  -     metoprolol succinate XL (TOPROL-XL) 50 MG 24 hr tablet; Take 1 tablet by mouth Daily.  Dispense: 90 tablet; Refill: 3  -     olmesartan (Benicar) 20 MG tablet; Take 1 tablet by mouth Daily.  Dispense: 30 tablet; Refill: 6                 MEDS ORDERED DURING VISIT:  New Medications Ordered This Visit   Medications   • metoprolol succinate XL (TOPROL-XL) 50 MG 24 hr tablet     Sig: Take 1 tablet by mouth Daily.     Dispense:  90 tablet     Refill:  3   • olmesartan (Benicar) 20 MG tablet     Sig: Take 1 tablet by mouth Daily.     Dispense:  30 tablet     Refill:  6           This document has been electronically signed by Alfonso Swanson Jr., DELBERT  October 28, 2021 22:25 EDT

## 2022-03-30 ENCOUNTER — OFFICE VISIT (OUTPATIENT)
Dept: CARDIOLOGY | Facility: CLINIC | Age: 22
End: 2022-03-30

## 2022-03-30 VITALS
DIASTOLIC BLOOD PRESSURE: 83 MMHG | SYSTOLIC BLOOD PRESSURE: 122 MMHG | BODY MASS INDEX: 44.41 KG/M2 | OXYGEN SATURATION: 95 % | HEIGHT: 68 IN | HEART RATE: 92 BPM | WEIGHT: 293 LBS

## 2022-03-30 DIAGNOSIS — R06.02 SOB (SHORTNESS OF BREATH): ICD-10-CM

## 2022-03-30 DIAGNOSIS — I10 ESSENTIAL HYPERTENSION: ICD-10-CM

## 2022-03-30 DIAGNOSIS — R55 SYNCOPE AND COLLAPSE: ICD-10-CM

## 2022-03-30 DIAGNOSIS — R00.0 TACHYCARDIA: Primary | ICD-10-CM

## 2022-03-30 DIAGNOSIS — R00.2 PALPITATIONS: ICD-10-CM

## 2022-03-30 PROCEDURE — 93000 ELECTROCARDIOGRAM COMPLETE: CPT | Performed by: NURSE PRACTITIONER

## 2022-03-30 PROCEDURE — 99214 OFFICE O/P EST MOD 30 MIN: CPT | Performed by: NURSE PRACTITIONER

## 2022-03-30 RX ORDER — METOPROLOL SUCCINATE 100 MG/1
100 TABLET, EXTENDED RELEASE ORAL DAILY
Qty: 90 TABLET | Refills: 3 | Status: SHIPPED | OUTPATIENT
Start: 2022-03-30

## 2022-03-30 RX ORDER — OLMESARTAN MEDOXOMIL 20 MG/1
20 TABLET ORAL DAILY
Qty: 90 TABLET | Refills: 3 | Status: SHIPPED | OUTPATIENT
Start: 2022-03-30 | End: 2022-09-01 | Stop reason: SDUPTHER

## 2022-09-01 ENCOUNTER — OFFICE VISIT (OUTPATIENT)
Dept: CARDIOLOGY | Facility: CLINIC | Age: 22
End: 2022-09-01

## 2022-09-01 VITALS
HEART RATE: 89 BPM | WEIGHT: 293 LBS | DIASTOLIC BLOOD PRESSURE: 75 MMHG | HEIGHT: 68 IN | BODY MASS INDEX: 44.41 KG/M2 | SYSTOLIC BLOOD PRESSURE: 109 MMHG | OXYGEN SATURATION: 97 %

## 2022-09-01 DIAGNOSIS — I10 ESSENTIAL HYPERTENSION: Primary | ICD-10-CM

## 2022-09-01 DIAGNOSIS — R00.0 TACHYCARDIA: ICD-10-CM

## 2022-09-01 DIAGNOSIS — R07.89 CHEST PAIN, ATYPICAL: ICD-10-CM

## 2022-09-01 DIAGNOSIS — R00.2 PALPITATIONS: ICD-10-CM

## 2022-09-01 DIAGNOSIS — R06.02 SOB (SHORTNESS OF BREATH): ICD-10-CM

## 2022-09-01 PROCEDURE — 99214 OFFICE O/P EST MOD 30 MIN: CPT | Performed by: NURSE PRACTITIONER

## 2022-09-01 PROCEDURE — 93000 ELECTROCARDIOGRAM COMPLETE: CPT | Performed by: NURSE PRACTITIONER

## 2022-09-01 RX ORDER — OLMESARTAN MEDOXOMIL 5 MG/1
5 TABLET ORAL DAILY
Qty: 90 TABLET | Refills: 3 | Status: SHIPPED | OUTPATIENT
Start: 2022-09-01

## 2022-09-01 RX ORDER — BREXPIPRAZOLE 1 MG/1
1 TABLET ORAL NIGHTLY
COMMUNITY

## 2022-09-01 RX ORDER — AMLODIPINE BESYLATE 2.5 MG/1
2.5 TABLET ORAL DAILY
Qty: 30 TABLET | Refills: 6 | Status: SHIPPED | OUTPATIENT
Start: 2022-09-01

## 2022-09-01 NOTE — PROGRESS NOTES
Subjective     Ike Soria is a 21 y.o. female who presents to day for 3 month follow up, Rapid Heart Rate, and Hypertension.    CHIEF COMPLIANT  Chief Complaint   Patient presents with   • 3 month follow up   • Rapid Heart Rate   • Hypertension       Active Problems:  Problem List Items Addressed This Visit        Cardiac and Vasculature    Tachycardia    Relevant Orders    Adult Transthoracic Echo Complete W/ Cont if Necessary Per Protocol    Palpitations    Relevant Orders    Adult Transthoracic Echo Complete W/ Cont if Necessary Per Protocol       Pulmonary and Pneumonias    SOB (shortness of breath)    Relevant Orders    Adult Transthoracic Echo Complete W/ Cont if Necessary Per Protocol      Other Visit Diagnoses     Essential hypertension    -  Primary    Relevant Medications    olmesartan (BENICAR) 5 MG tablet    amLODIPine (NORVASC) 2.5 MG tablet    Other Relevant Orders    Adult Transthoracic Echo Complete W/ Cont if Necessary Per Protocol    Chest pain, atypical        Relevant Orders    Adult Transthoracic Echo Complete W/ Cont if Necessary Per Protocol      PrOBLEM LIST:   1. Tachycardia  1.1 Holter; ST with symptoms of fatigue  1.2 EKG; ST, normal axis, EPR, minor NSST-T, no acute ST changes noted.   2. Shortness of Breath  2.1 stress Echo 8/21: No EKG evidence of ischemia with echocardiographic images somewhat equivocal  3. Chest pain    HPI  Hypertension  Associated symptoms include chest pain (constant stabbing pain in the middle of chest is a deep pain) and palpitations (racing). Pertinent negatives include no neck pain or shortness of breath.     Ms. Soria is a 21-year-old who presents today for follow up of angina and rapid heart rate. The patient currently takes metoprolol 100 MG. The patient had a tilt table to which she did not meet the full diagnostic criteria for orthostatic hypotension. An adult female accompanies her to this visit.  Her symptoms did occur with dropping of blood pressure  "when she changes positions.    She has been experiencing angina that she describes a sharp, stabbing pain in the middle of her chest that is always present. Laying down will help alleviate this symptom, but moving around will make it worse.     She denies nausea associated with her symptoms.     She will experience dyspnea with her palpitations, but notes the palpitations have no correlation with her angina. She states activity will cause palpitations. With her palpitations she will also experience dizziness. This is occuring everyday, 7 times a day. Her palpitations are unchanged since 12/2021. She notes her \"whole body will throb,\" when she starts to experience palpitations.     The patient notes random dizziness.     She is fatigued all the time and states she is sleeping 18 hours daily.     She notes she is planning to undergo weight loss surgery. She has recently lost 7 pounds.     When she was at the optometrist recently her blood pressure was 104/56 mmHg.     The adult female present with her today states when she walks a short distance or climbs a few steps she will experience tachycardia, fatigue and dyspnea. She will experience tunnel vision when she gets up to walk.     PRIOR MEDS  Current Outpatient Medications on File Prior to Visit   Medication Sig Dispense Refill   • Brexpiprazole (Rexulti) 1 MG tablet Take 1 mg by mouth Every Night.     • busPIRone (BUSPAR) 15 MG tablet 15 mg Daily.     • Ferrous Sulfate (FE TABS PO) Take 65 mg by mouth Daily. 3 tab po qd     • metoprolol succinate XL (TOPROL-XL) 100 MG 24 hr tablet Take 1 tablet by mouth Daily. 90 tablet 3   • venlafaxine 75 MG tablet sustained-release 24 hour 24 hr tablet 75 mg Daily.     • [DISCONTINUED] olmesartan (Benicar) 20 MG tablet Take 1 tablet by mouth Daily. 90 tablet 3   • [DISCONTINUED] albuterol sulfate  (90 Base) MCG/ACT inhaler Inhale 2 puffs Every 4 (Four) Hours As Needed for Wheezing or Shortness of Air. 1 inhaler 1   • " [DISCONTINUED] ARIPiprazole (ABILIFY) 15 MG tablet      • [DISCONTINUED] aspirin 81 MG tablet Take 81 mg by mouth Daily.     • [DISCONTINUED] venlafaxine XR (EFFEXOR-XR) 150 MG 24 hr capsule 150 mg Daily.       No current facility-administered medications on file prior to visit.       ALLERGIES  Bee venom, Blueberry flavor, Cinnamon flavor, Coconut flavor, Nuts, Latex, and Penicillins    HISTORY  Past Medical History:   Diagnosis Date   • Anxiety and depression    • Arrhythmia    • Asthma    • Schizo-affective schizophrenia, chronic condition (HCC)    • Tachycardia        Social History     Socioeconomic History   • Marital status: Single   Tobacco Use   • Smoking status: Current Every Day Smoker     Packs/day: 1.00     Years: 7.00     Pack years: 7.00     Types: Cigarettes   • Smokeless tobacco: Never Used   Substance and Sexual Activity   • Alcohol use: No   • Drug use: No   • Sexual activity: Defer       Family History   Problem Relation Age of Onset   • Diabetes Mother    • Heart attack Mother    • Bone cancer Maternal Great-Grandmother        Review of Systems   Constitutional: Positive for fatigue. Negative for chills and fever.   HENT: Negative for congestion, rhinorrhea and sore throat.    Respiratory: Negative for chest tightness and shortness of breath.    Cardiovascular: Positive for chest pain (constant stabbing pain in the middle of chest is a deep pain), palpitations (racing) and leg swelling (right ankle).   Gastrointestinal: Positive for constipation. Negative for diarrhea and nausea.   Musculoskeletal: Positive for arthralgias (knees and hips). Negative for back pain and neck pain.   Allergic/Immunologic: Positive for environmental allergies and food allergies (nuts coconut blueberries cinn.).   Neurological: Positive for dizziness (getting to quickly and also random) and weakness. Negative for syncope and light-headedness.   Hematological: Bruises/bleeds easily (bruise).   Psychiatric/Behavioral:  "Positive for sleep disturbance (not pulmonary related lots of nightmares).       Objective     VITALS: /75 (BP Location: Right arm, Patient Position: Sitting)   Pulse 89   Ht 172.7 cm (67.99\")   Wt (!) 157 kg (347 lb)   SpO2 97%   BMI 52.77 kg/m²     LABS:   Lab Results (most recent)     None          IMAGING:   No Images in the past 120 days found..    EXAM:  Physical Exam  Vitals and nursing note reviewed.   Constitutional:       Appearance: She is well-developed.   HENT:      Head: Normocephalic.   Eyes:      Pupils: Pupils are equal, round, and reactive to light.   Neck:      Thyroid: No thyroid mass.      Vascular: No carotid bruit or JVD.      Trachea: Trachea and phonation normal.   Cardiovascular:      Rate and Rhythm: Normal rate and regular rhythm.      Pulses:           Radial pulses are 2+ on the right side and 2+ on the left side.        Posterior tibial pulses are 2+ on the right side and 2+ on the left side.      Heart sounds: Normal heart sounds. No murmur heard.    No friction rub. No gallop.   Pulmonary:      Effort: Pulmonary effort is normal. No respiratory distress.      Breath sounds: Normal breath sounds. No wheezing or rales.   Abdominal:      General: Bowel sounds are normal.      Palpations: Abdomen is soft.   Musculoskeletal:         General: No swelling. Normal range of motion.      Cervical back: Neck supple.   Skin:     General: Skin is warm and dry.      Capillary Refill: Capillary refill takes less than 2 seconds.      Findings: No rash.   Neurological:      Mental Status: She is alert and oriented to person, place, and time.   Psychiatric:         Speech: Speech normal.         Behavior: Behavior normal.         Thought Content: Thought content normal.         Judgment: Judgment normal.         Procedure     ECG 12 Lead    Date/Time: 9/1/2022 1:04 PM  Performed by: Alfonso Swanson APRN  Authorized by: Alfonso Swanson APRN   Rhythm: sinus rhythm  Rate: normal  BPM: " 88  QRS axis: normal  Other findings: non-specific ST-T wave changes  Comments:  ms  No acute changes               Assessment & Plan    Diagnosis Plan   1. Essential hypertension  olmesartan (BENICAR) 5 MG tablet    Adult Transthoracic Echo Complete W/ Cont if Necessary Per Protocol   2. Tachycardia  Adult Transthoracic Echo Complete W/ Cont if Necessary Per Protocol   3. Palpitations  Adult Transthoracic Echo Complete W/ Cont if Necessary Per Protocol   4. Chest pain, atypical  Adult Transthoracic Echo Complete W/ Cont if Necessary Per Protocol   5. SOB (shortness of breath)  Adult Transthoracic Echo Complete W/ Cont if Necessary Per Protocol       Return in about 3 months (around 12/1/2022), or if symptoms worsen or fail to improve.     1. Results of her tilt table was discussed at length during this visit. The patient was advised about the nonpharmaceutical treatment of orthostatic hypotension to include compression socks, increased sodium intake, and adequate fluid intake.   2. Due to the patients hypotension Olmesartan from 20 MG to 5 MG daily.   3. Advised the patient that she is a low cardiac risk for weight loss surgery. Repeat echocardiogram will be obtained perioperatively since her last echocardiogram was obtained in 2018.   4. The patient will keep a blood pressure log for the next 2 weeks and to record and symptoms she experiences. She was advised to take her blood pressure 1 hour after her medications. If her blood pressure is consistently 140/90 mmHg or greater she is to call the office.   5. Due to the patient experiencing chest pain mlodipine was prescribed. Advised the patient that if this medication is not working at her follow up on 12/2022 it will be discontinued.  6.  Informed of signs and symptoms of ACS and advised to seek emergent treatment for any new worsening symptoms.  Patient also advised sooner follow-up as needed.  Also advised to follow-up with family doctor as needed  This  note is dictated utilizing voice recognition software.  Although this record has been proof read, transcriptional errors may still be present. If questions occur regarding the content of this record please do not hesitate to call our office.  I have reviewed and confirmed the accuracy of the ROS as documented by the MA/LPN/RN DELBERT Valdez    Diagnoses and all orders for this visit:    1. Essential hypertension (Primary)  -     olmesartan (BENICAR) 5 MG tablet; Take 1 tablet by mouth Daily.  Dispense: 90 tablet; Refill: 3  -     Adult Transthoracic Echo Complete W/ Cont if Necessary Per Protocol; Future    2. Tachycardia  -     Adult Transthoracic Echo Complete W/ Cont if Necessary Per Protocol; Future    3. Palpitations  -     Adult Transthoracic Echo Complete W/ Cont if Necessary Per Protocol; Future    4. Chest pain, atypical  -     Adult Transthoracic Echo Complete W/ Cont if Necessary Per Protocol; Future    5. SOB (shortness of breath)  -     Adult Transthoracic Echo Complete W/ Cont if Necessary Per Protocol; Future    Other orders  -     ECG 12 Lead  -     amLODIPine (NORVASC) 2.5 MG tablet; Take 1 tablet by mouth Daily.  Dispense: 30 tablet; Refill: 6        Ike Soria  reports that she has been smoking cigarettes. She has a 7.00 pack-year smoking history. She has never used smokeless tobacco.. I have educated her on the risk of diseases from using tobacco products.        MEDS ORDERED DURING VISIT:  New Medications Ordered This Visit   Medications   • olmesartan (BENICAR) 5 MG tablet     Sig: Take 1 tablet by mouth Daily.     Dispense:  90 tablet     Refill:  3   • amLODIPine (NORVASC) 2.5 MG tablet     Sig: Take 1 tablet by mouth Daily.     Dispense:  30 tablet     Refill:  6           This document has been electronically signed by DELBERT Valdez Jr.  September 1, 2022 17:24 EDT    .[email or web address] Vida Bernardo.

## 2022-10-05 ENCOUNTER — HOSPITAL ENCOUNTER (OUTPATIENT)
Dept: CARDIOLOGY | Facility: HOSPITAL | Age: 22
Discharge: HOME | End: 2022-10-05
Admitting: NURSE PRACTITIONER

## 2022-10-05 VITALS — HEIGHT: 68 IN | WEIGHT: 293 LBS | BODY MASS INDEX: 44.41 KG/M2

## 2022-10-05 DIAGNOSIS — R00.2 PALPITATIONS: ICD-10-CM

## 2022-10-05 DIAGNOSIS — R06.02 SOB (SHORTNESS OF BREATH): ICD-10-CM

## 2022-10-05 DIAGNOSIS — R07.89 CHEST PAIN, ATYPICAL: ICD-10-CM

## 2022-10-05 DIAGNOSIS — R00.0 TACHYCARDIA: ICD-10-CM

## 2022-10-05 DIAGNOSIS — I10 ESSENTIAL HYPERTENSION: ICD-10-CM

## 2022-10-05 PROCEDURE — 93306 TTE W/DOPPLER COMPLETE: CPT

## 2022-10-05 PROCEDURE — 93306 TTE W/DOPPLER COMPLETE: CPT | Performed by: INTERNAL MEDICINE

## 2022-10-29 LAB
AORTIC DIMENSIONLESS INDEX: 0.85 (DI)
BH CV ECHO MEAS - ACS: 1.34 CM
BH CV ECHO MEAS - AO MAX PG: 4.8 MMHG
BH CV ECHO MEAS - AO MEAN PG: 2.6 MMHG
BH CV ECHO MEAS - AO ROOT DIAM: 2.7 CM
BH CV ECHO MEAS - AO V2 MAX: 109.2 CM/SEC
BH CV ECHO MEAS - AO V2 VTI: 22 CM
BH CV ECHO MEAS - EDV(CUBED): 127.2 ML
BH CV ECHO MEAS - EF(MOD-BP): 63 %
BH CV ECHO MEAS - ESV(CUBED): 35.5 ML
BH CV ECHO MEAS - FS: 34.7 %
BH CV ECHO MEAS - IVS/LVPW: 0.94 CM
BH CV ECHO MEAS - IVSD: 0.95 CM
BH CV ECHO MEAS - LA DIMENSION: 3.8 CM
BH CV ECHO MEAS - LAT PEAK E' VEL: 13.7 CM/SEC
BH CV ECHO MEAS - LV MASS(C)D: 177.4 GRAMS
BH CV ECHO MEAS - LV MAX PG: 3.5 MMHG
BH CV ECHO MEAS - LV MEAN PG: 1.45 MMHG
BH CV ECHO MEAS - LV V1 MAX: 93 CM/SEC
BH CV ECHO MEAS - LV V1 VTI: 18.9 CM
BH CV ECHO MEAS - LVIDD: 5 CM
BH CV ECHO MEAS - LVIDS: 3.3 CM
BH CV ECHO MEAS - LVPWD: 1 CM
BH CV ECHO MEAS - MED PEAK E' VEL: 9.4 CM/SEC
BH CV ECHO MEAS - MV A MAX VEL: 30.8 CM/SEC
BH CV ECHO MEAS - MV DEC SLOPE: 468.1 CM/SEC2
BH CV ECHO MEAS - MV DEC TIME: 0.2 MSEC
BH CV ECHO MEAS - MV E MAX VEL: 76.2 CM/SEC
BH CV ECHO MEAS - MV E/A: 2.47
BH CV ECHO MEAS - MV MAX PG: 3.7 MMHG
BH CV ECHO MEAS - MV MEAN PG: 1.26 MMHG
BH CV ECHO MEAS - MV P1/2T: 64.7 MSEC
BH CV ECHO MEAS - MV V2 VTI: 25.8 CM
BH CV ECHO MEAS - MVA(P1/2T): 3.4 CM2
BH CV ECHO MEAS - PA V2 MAX: 96.6 CM/SEC
BH CV ECHO MEAS - RAP SYSTOLE: 10 MMHG
BH CV ECHO MEAS - RV MAX PG: 2.42 MMHG
BH CV ECHO MEAS - RV V1 MAX: 77.8 CM/SEC
BH CV ECHO MEAS - RV V1 VTI: 19.4 CM
BH CV ECHO MEAS - RVDD: 2.8 CM
BH CV ECHO MEAS - RVSP: 21.3 MMHG
BH CV ECHO MEAS - TAPSE (>1.6): 2.12 CM
BH CV ECHO MEAS - TR MAX PG: 11.3 MMHG
BH CV ECHO MEAS - TR MAX VEL: 168.1 CM/SEC
BH CV ECHO MEASUREMENTS AVERAGE E/E' RATIO: 6.6
BH CV XLRA - TDI S': 12 CM/SEC
MAXIMAL PREDICTED HEART RATE: 197 BPM
SINUS: 2.8 CM
STRESS TARGET HR: 167 BPM

## 2022-11-01 ENCOUNTER — TELEPHONE (OUTPATIENT)
Dept: CARDIOLOGY | Facility: CLINIC | Age: 22
End: 2022-11-01

## 2022-11-01 NOTE — TELEPHONE ENCOUNTER
ECHO  Pt notified of no acute findings. Provider will discuss results at f/u. Pt reminded of appt date and time.    ----- Message from aKe Dennis MA sent at 10/31/2022  5:10 PM EDT -----    ----- Message -----  From: Alfonso Swanson APRN  Sent: 10/31/2022  10:45 AM EDT  To: Kae Dennis MA    There is no acute findings on the echocardiogram.  Keep follow-up.

## 2022-12-13 ENCOUNTER — TELEPHONE (OUTPATIENT)
Dept: CARDIOLOGY | Facility: CLINIC | Age: 22
End: 2022-12-13

## 2022-12-13 NOTE — TELEPHONE ENCOUNTER
For the HUB to read to pt:       LEFT MESSAGE TO CONFIRM APPT, IF PT CALLS BACK PLEASE PUT THE CALL THROUGH. THANK YOU

## 2023-01-20 ENCOUNTER — OFFICE VISIT (OUTPATIENT)
Dept: CARDIOLOGY | Facility: CLINIC | Age: 23
End: 2023-01-20
Payer: COMMERCIAL

## 2023-01-20 VITALS
WEIGHT: 293 LBS | BODY MASS INDEX: 44.41 KG/M2 | OXYGEN SATURATION: 97 % | SYSTOLIC BLOOD PRESSURE: 120 MMHG | HEART RATE: 71 BPM | DIASTOLIC BLOOD PRESSURE: 74 MMHG | HEIGHT: 68 IN

## 2023-01-20 DIAGNOSIS — R00.0 TACHYCARDIA: ICD-10-CM

## 2023-01-20 DIAGNOSIS — R06.02 SOB (SHORTNESS OF BREATH): ICD-10-CM

## 2023-01-20 DIAGNOSIS — K21.9 CHEST PAIN DUE TO GERD: ICD-10-CM

## 2023-01-20 DIAGNOSIS — R07.9 CHEST PAIN DUE TO GERD: ICD-10-CM

## 2023-01-20 DIAGNOSIS — R55 SYNCOPE AND COLLAPSE: ICD-10-CM

## 2023-01-20 DIAGNOSIS — I10 ESSENTIAL HYPERTENSION: Primary | ICD-10-CM

## 2023-01-20 DIAGNOSIS — R07.89 CHEST PAIN, ATYPICAL: ICD-10-CM

## 2023-01-20 DIAGNOSIS — R00.2 PALPITATIONS: ICD-10-CM

## 2023-01-20 PROCEDURE — 99214 OFFICE O/P EST MOD 30 MIN: CPT | Performed by: NURSE PRACTITIONER

## 2023-01-20 RX ORDER — OMEPRAZOLE 20 MG/1
20 CAPSULE, DELAYED RELEASE ORAL DAILY
Qty: 90 CAPSULE | Refills: 1 | Status: SHIPPED | OUTPATIENT
Start: 2023-01-20

## 2023-01-20 NOTE — PROGRESS NOTES
"Subjective     Rina Giron is a 22 y.o. female who presents to day for 3 month testing follow up  (Echo ).    CHIEF COMPLIANT  Chief Complaint   Patient presents with   • 3 month testing follow up      Echo        Active Problems:  Problem List Items Addressed This Visit    None  PROBLEM LIST:   1. Tachycardia  1.1 Holter; ST with symptoms of fatigue  1.2 EKG; ST, normal axis, EPR, minor NSST-T, no acute ST changes noted.   2. Shortness of Breath  2.1 stress Echo 8/21: No EKG evidence of ischemia with echocardiographic images somewhat equivocal  3. Chest pain    HPI    RINA GIRON is a 22-year-old female patient being followed up today for test results. Patient did go under an echocardiogram that identified ejection fraction of 60 to 65 percent, normal diastolic function with trivial tricuspid regurgitation. No other acute findings. She also had a tilt table test back in 04/2022. The tilt table test was performed at [facility] in which she developed the same tunnel vision in which she is describing today in which her systolic blood pressure dropped from 116 mmHg to 102 mmHg which is consistent with orthostatic hypotension. The patient is accompanied by an adult female.    The patient's echocardiogram from 04/2022 revealed normal left ventricular size and function. The right atrium is normal. The ascending aorta and pulmonary artery looked good. The blood pressure in her lungs were also normal.    The patient's blood pressure today is 120/74 mmHg with a heart rate of 71 beats per minute. She is currently taking amlodipine, olmesartan, and metoprolol.    The adult female states the amlodipine was prescribed to the patient at her last visit, and it is effective. The patient's visitor states that the patient experienced \"too hard of beats,\" and the medication has calmed this.    The patient continues to experience chest pain under her collarbone. She states it is more in the middle of her chest. " "The patient states this occurs every other day, especially if she has to get up and walk a lot. She states it hurts to breathe. The patient states it feels similar to the sensation following a muscle cramp. She states it causes shortness of breath.    The patient states she drinks a lot of Gatorade. She states she drinks about 32 ounces a day. The patient states she does not drink water on top of that. She states she purchases flavored water when she gets paid.    The adult female states the patient has started taking a diet pill Golo. The patient states she has only gained 3 pounds. The adult female states that the patient is not changing her diet, and the patient thinks the pills are going to be a miracle worker. The patient states the nurse talked to her about what she should do instead of eating big plates. The patient states she has a bicycle that she would like to slowly begin utilizing for exercise. The patient states she has an ru on her phone that is monitoring her weight.    The patient states that she has depression and schizophrenia. She has trouble with motivation. The adult female states the patient experienced muscle cramping last night. The adult female states her feet are swollen.    The patient has not had blood work done recently by Dr. Herrera. The patient states she sees hematology every 3 months. The adult female states that the patient's platelets have gone up to 700,000. The adult female states she has an appointment on 02/13/2023 with Dr. Moya.    The patient states that she has heartburn a lot. The patient states that she used to see Dr. Spaulding for her gallbladder. The adult female states it has been \"a little bit\" since the patient has been on a medication for symptoms. The adult female states she has never been told she has H. pylori.    The patient experiences tunnel vision and lightheadedness.    PRIOR MEDS  Current Outpatient Medications on File Prior to Visit   Medication Sig " Dispense Refill   • amLODIPine (NORVASC) 2.5 MG tablet Take 1 tablet by mouth Daily. 30 tablet 6   • Brexpiprazole (Rexulti) 1 MG tablet Take 1 mg by mouth Every Night.     • busPIRone (BUSPAR) 15 MG tablet 15 mg Daily.     • Ferrous Sulfate (FE TABS PO) Take 65 mg by mouth Daily. 3 tab po qd     • metoprolol succinate XL (TOPROL-XL) 100 MG 24 hr tablet Take 1 tablet by mouth Daily. 90 tablet 3   • olmesartan (BENICAR) 5 MG tablet Take 1 tablet by mouth Daily. 90 tablet 3   • venlafaxine 75 MG tablet sustained-release 24 hour 24 hr tablet 75 mg Daily.       No current facility-administered medications on file prior to visit.       ALLERGIES  Bee venom, Blueberry flavor, Cinnamon flavor, Coconut flavor, Nuts, Latex, and Penicillins    HISTORY  Past Medical History:   Diagnosis Date   • Anxiety and depression    • Arrhythmia    • Asthma    • Schizo-affective schizophrenia, chronic condition (HCC)    • Tachycardia        Social History     Socioeconomic History   • Marital status: Single   Tobacco Use   • Smoking status: Every Day     Packs/day: 1.00     Years: 7.00     Pack years: 7.00     Types: Cigarettes   • Smokeless tobacco: Never   Substance and Sexual Activity   • Alcohol use: No   • Drug use: No   • Sexual activity: Defer       Family History   Problem Relation Age of Onset   • Diabetes Mother    • Heart attack Mother    • Bone cancer Maternal Great-Grandmother        Review of Systems   Constitutional: Negative for chills, fatigue and fever.   HENT: Positive for rhinorrhea. Negative for congestion and sore throat.    Respiratory: Positive for shortness of breath. Negative for chest tightness.    Cardiovascular: Positive for chest pain (rare is under collar bone), palpitations (racing walking just short distances) and leg swelling (feet swelling shoes are tight ).   Gastrointestinal: Positive for diarrhea. Negative for constipation and nausea.   Musculoskeletal: Positive for arthralgias (Right hip ) and back  "pain. Negative for neck pain.   Allergic/Immunologic: Positive for environmental allergies and food allergies (see allergies).   Neurological: Positive for dizziness (random), weakness and light-headedness (lips get numb gets tunnel vision but does not pass out). Negative for syncope.   Hematological: Bruises/bleeds easily (bruise).   Psychiatric/Behavioral: Negative for sleep disturbance.       Objective     VITALS: /74 (BP Location: Left arm, Patient Position: Sitting, Cuff Size: Adult)   Pulse 71   Ht 172 cm (67.72\")   Wt (!) 163 kg (360 lb 6.4 oz)   SpO2 97%   BMI 55.26 kg/m²     LABS:   Lab Results (most recent)     None          IMAGING:   No Images in the past 120 days found..    EXAM:  Physical Exam  Vitals and nursing note reviewed.   Constitutional:       Appearance: She is well-developed.   HENT:      Head: Normocephalic.   Eyes:      Pupils: Pupils are equal, round, and reactive to light.   Neck:      Thyroid: No thyroid mass.      Vascular: No carotid bruit or JVD.      Trachea: Trachea and phonation normal.   Cardiovascular:      Rate and Rhythm: Normal rate and regular rhythm.      Pulses:           Radial pulses are 2+ on the right side and 2+ on the left side.        Posterior tibial pulses are 2+ on the right side and 2+ on the left side.      Heart sounds: Normal heart sounds. No murmur heard.    No friction rub. No gallop.   Pulmonary:      Effort: Pulmonary effort is normal. No respiratory distress.      Breath sounds: Normal breath sounds. No wheezing or rales.   Abdominal:      General: Bowel sounds are normal.      Palpations: Abdomen is soft.   Musculoskeletal:         General: No swelling. Normal range of motion.      Cervical back: Neck supple.   Skin:     General: Skin is warm and dry.      Capillary Refill: Capillary refill takes less than 2 seconds.      Findings: No rash.   Neurological:      Mental Status: She is alert and oriented to person, place, and time.   Psychiatric: "         Speech: Speech normal.         Behavior: Behavior normal.         Thought Content: Thought content normal.         Judgment: Judgment normal.         Procedure   Procedures       Assessment & Plan    Diagnosis Plan   1. Essential hypertension  Comprehensive Metabolic Panel    CBC & Differential    Lipid Panel    TSH    Magnesium      2. Tachycardia  Comprehensive Metabolic Panel    CBC & Differential    Lipid Panel    TSH    Magnesium      3. Palpitations  Comprehensive Metabolic Panel    CBC & Differential    Lipid Panel    TSH    Magnesium      4. Chest pain, atypical  Comprehensive Metabolic Panel    CBC & Differential    Lipid Panel    TSH    Magnesium      5. Syncope and collapse        6. SOB (shortness of breath)  Comprehensive Metabolic Panel    CBC & Differential    Lipid Panel    TSH    Magnesium      7. Chest pain due to GERD  Helicobacter Pylori, IgA IgG IgM        1. The patient's echocardiogram results were reviewed in detail.  All questions answered.  2. The patient was encouraged to discuss her weight gain with Dr. Herrera.  Discussed other potential options.  Patient does not want to proceed with any weight loss surgeries.  She is currently using a supplement called Golo.  She has gained weight while she is try this supplement.  3. The patient was prescribed omeprazole.  This will help rule out GI impact on her continued chest pain.  4. The patient will be tested for the Helicobacter pylori bacteria. She will have other blood work done as well.  5.  Patient's blood pressure is controlled on current blood pressure medication regimen.  No medication changes are warranted at this time.  Patient advised to monitor blood pressure on a daily basis and report any persistent highs or lows.  Set goal blood pressure for patient at 130/80 or below.  6.  Informed of signs and symptoms of ACS and advised to seek emergent treatment for any new worsening symptoms.  Patient also advised sooner follow-up  as needed.  Also advised to follow-up with family doctor as needed  This note is dictated utilizing voice recognition software.  Although this record has been proof read, transcriptional errors may still be present. If questions occur regarding the content of this record please do not hesitate to call our office.  I have reviewed and confirmed the accuracy of the ROS as documented by the MA/LPN/RN Alfonso Swanson APRN    Return in about 6 months (around 7/20/2023), or if symptoms worsen or fail to improve.    Diagnoses and all orders for this visit:    1. Essential hypertension (Primary)  -     Comprehensive Metabolic Panel; Future  -     CBC & Differential; Future  -     Lipid Panel; Future  -     TSH; Future  -     Magnesium; Future    2. Tachycardia  -     Comprehensive Metabolic Panel; Future  -     CBC & Differential; Future  -     Lipid Panel; Future  -     TSH; Future  -     Magnesium; Future    3. Palpitations  -     Comprehensive Metabolic Panel; Future  -     CBC & Differential; Future  -     Lipid Panel; Future  -     TSH; Future  -     Magnesium; Future    4. Chest pain, atypical  -     Comprehensive Metabolic Panel; Future  -     CBC & Differential; Future  -     Lipid Panel; Future  -     TSH; Future  -     Magnesium; Future    5. Syncope and collapse    6. SOB (shortness of breath)  -     Comprehensive Metabolic Panel; Future  -     CBC & Differential; Future  -     Lipid Panel; Future  -     TSH; Future  -     Magnesium; Future    7. Chest pain due to GERD  -     Cancel: Helicobacter Pylori, IgA IgG IgM; Future  -     Helicobacter Pylori, IgA IgG IgM; Future    Other orders  -     omeprazole (priLOSEC) 20 MG capsule; Take 1 capsule by mouth Daily.  Dispense: 90 capsule; Refill: 1        Ike Soria  reports that she has been smoking cigarettes. She has a 7.00 pack-year smoking history. She has never used smokeless tobacco.. I have educated her on the risk of diseases from using tobacco products.           MEDS ORDERED DURING VISIT:  New Medications Ordered This Visit   Medications   • omeprazole (priLOSEC) 20 MG capsule     Sig: Take 1 capsule by mouth Daily.     Dispense:  90 capsule     Refill:  1         This document has been electronically signed by DELBERT Valdez Jr.  January 20, 2023 10:37 EST     Transcribed from ambient dictation for DELBERT Valdez by Nicole Chairez.  01/20/23   12:17 EST    Patient or patient representative verbalized consent to the visit recording.  I have personally performed the services described in this document as transcribed by the above individual, and it is both accurate and complete.

## 2023-06-05 RX ORDER — AMLODIPINE BESYLATE 2.5 MG/1
2.5 TABLET ORAL DAILY
Qty: 30 TABLET | Refills: 6 | Status: SHIPPED | OUTPATIENT
Start: 2023-06-05

## 2023-07-20 ENCOUNTER — OFFICE VISIT (OUTPATIENT)
Dept: CARDIOLOGY | Facility: CLINIC | Age: 23
End: 2023-07-20
Payer: COMMERCIAL

## 2023-07-20 VITALS
SYSTOLIC BLOOD PRESSURE: 125 MMHG | BODY MASS INDEX: 44.41 KG/M2 | WEIGHT: 293 LBS | HEIGHT: 68 IN | HEART RATE: 89 BPM | OXYGEN SATURATION: 97 % | DIASTOLIC BLOOD PRESSURE: 80 MMHG

## 2023-07-20 DIAGNOSIS — R06.02 SOB (SHORTNESS OF BREATH): ICD-10-CM

## 2023-07-20 DIAGNOSIS — R00.2 PALPITATIONS: Primary | ICD-10-CM

## 2023-07-20 DIAGNOSIS — I10 ESSENTIAL HYPERTENSION: ICD-10-CM

## 2023-07-20 PROCEDURE — 1160F RVW MEDS BY RX/DR IN RCRD: CPT | Performed by: NURSE PRACTITIONER

## 2023-07-20 PROCEDURE — 93000 ELECTROCARDIOGRAM COMPLETE: CPT | Performed by: NURSE PRACTITIONER

## 2023-07-20 PROCEDURE — 1159F MED LIST DOCD IN RCRD: CPT | Performed by: NURSE PRACTITIONER

## 2023-07-20 PROCEDURE — 99213 OFFICE O/P EST LOW 20 MIN: CPT | Performed by: NURSE PRACTITIONER

## 2023-07-20 RX ORDER — OLMESARTAN MEDOXOMIL 5 MG/1
5 TABLET ORAL DAILY
Qty: 90 TABLET | Refills: 3 | Status: SHIPPED | OUTPATIENT
Start: 2023-07-20

## 2023-07-20 RX ORDER — VILAZODONE HYDROCHLORIDE 10 MG/1
10 TABLET ORAL DAILY
COMMUNITY

## 2023-07-20 RX ORDER — ALBUTEROL SULFATE 90 UG/1
2 AEROSOL, METERED RESPIRATORY (INHALATION) EVERY 4 HOURS PRN
COMMUNITY

## 2023-07-20 RX ORDER — MEDROXYPROGESTERONE ACETATE 10 MG/1
10 TABLET ORAL DAILY
COMMUNITY

## 2023-07-20 NOTE — LETTER
July 26, 2023     Dario Herrera DO  [address] [place] [address] Way  [address]  [facility] [address]    Patient: Ike Wilson   YOB: 2000   Date of Visit: 7/20/2023     Dear Dario Herrera DO:       Thank you for referring Ike Wilson to me for evaluation. Below are the relevant portions of my assessment and plan of care.    If you have questions, please do not hesitate to call me. I look forward to following Ike along with you.         Sincerely,        DELBERT Valdez        CC: No Recipients    Alfonso Swanson APRN  07/26/23 0518  Sign when Signing Visit  Subjective     Ike Wilson is a 22 y.o. female who presents to day for Hypertension.    CHIEF COMPLIANT  Chief Complaint   Patient presents with   • Hypertension       Active Problems:  Problem List Items Addressed This Visit          Cardiac and Vasculature    Palpitations - Primary    Relevant Orders    ECG 12 Lead       Pulmonary and Pneumonias    SOB (shortness of breath)    Relevant Orders    ECG 12 Lead     Other Visit Diagnoses       Essential hypertension        Relevant Medications    olmesartan (BENICAR) 5 MG tablet    Other Relevant Orders    ECG 12 Lead        PROBLEM LIST:   1. Tachycardia  1.1 Holter; ST with symptoms of fatigue  1.2 EKG; ST, normal axis, EPR, minor NSST-T, no acute ST changes noted.   2. Shortness of Breath  2.1 stress Echo 8/21: No EKG evidence of ischemia with echocardiographic images somewhat equivocal  3. Chest pain    HPI  HPI  Ike Wilson is a 22-year-old female patient who is being followed up today for chronic arterial hypertension. The patient is accompanied by an adult female.    The patient does have chronic arterial hypertension where she is on amlodipine, metoprolol, and olmesartan. Today, her blood pressure is 125/80 mmHg, heart rate of 89 beats per minute.    The patient is doing well. She reports she recently twisted her ankle and fell.  She sustained a radial head fracture of her right elbow  when she fell. She is going through physical therapy which is painful.    Her shortness of breath has improved a little. She is now taking asthma medication which has helped a little. She denies orthopnea. She no longer experiences shortness of breath when sitting around.    She states that every time she gets into her bed, she will feel palpitations, noting that her bed is tall, and she has to climb into it. She will sit and take a break before she gets into bed. She sleeps with a lot of pillows, so it takes a lot of movement to get right. The palpitations last a few minutes and occasionally make her dizzy. She denies the palpitations happening during the day.    The patient states she takes naps a lot because she gets tired.    She is in the process of qualifying for bariatric surgery.  Due to her most recent stress echo that had no evidence of ischemia I feel as if patient would be at an acceptable risk to move forth with surgery with standard precautions.  She is on no antiplatelet or anticoagulant therapy to be held.    The patient states her father takes care of her medications.      PRIOR MEDS  Current Outpatient Medications on File Prior to Visit   Medication Sig Dispense Refill   • albuterol sulfate  (90 Base) MCG/ACT inhaler Inhale 2 puffs Every 4 (Four) Hours As Needed for Wheezing.     • amLODIPine (NORVASC) 2.5 MG tablet TAKE 1 TABLET BY MOUTH DAILY 30 tablet 6   • Brexpiprazole (Rexulti) 1 MG tablet Take 1 mg by mouth Every Night.     • busPIRone (BUSPAR) 15 MG tablet 1 tablet Daily.     • medroxyPROGESTERone (PROVERA) 10 MG tablet Take 1 tablet by mouth Daily.     • metoprolol succinate XL (TOPROL-XL) 100 MG 24 hr tablet TAKE 1 TABLET BY MOUTH DAILY 90 tablet 3   • omeprazole (priLOSEC) 20 MG capsule TAKE 1 CAPSULE BY MOUTH DAILY 90 capsule 1   • tiotropium (SPIRIVA) 18 MCG per inhalation capsule Place 1 capsule into inhaler and inhale Daily.     • vilazodone (VIIBRYD) 10 MG tablet tablet Take  "1 tablet by mouth Daily.       No current facility-administered medications on file prior to visit.       ALLERGIES  Bee venom, Blueberry flavor, Cinnamon flavor, Coconut flavor, Nuts, Latex, and Penicillins    HISTORY  Past Medical History:   Diagnosis Date   • Anxiety and depression    • Arrhythmia    • Asthma    • Fractured elbow 07/2023    Right elbow   • Schizo-affective schizophrenia, chronic condition    • Tachycardia        Social History     Socioeconomic History   • Marital status: Single   Tobacco Use   • Smoking status: Every Day     Packs/day: 1.00     Years: 7.00     Pack years: 7.00     Types: Cigarettes   • Smokeless tobacco: Never   Substance and Sexual Activity   • Alcohol use: No   • Drug use: No   • Sexual activity: Defer       Family History   Problem Relation Age of Onset   • Diabetes Mother    • Heart attack Mother    • Bone cancer Maternal Great-Grandmother        Review of Systems   Constitutional:  Positive for fatigue. Negative for chills and fever.   HENT:  Positive for rhinorrhea. Negative for congestion and sore throat.    Respiratory:  Positive for shortness of breath. Negative for chest tightness.    Cardiovascular:  Positive for palpitations (racing and flutters) and leg swelling (R foot / ankle sprained it). Negative for chest pain.   Gastrointestinal:  Positive for diarrhea. Negative for constipation and nausea.   Musculoskeletal:  Positive for arthralgias and back pain. Negative for neck pain.   Allergic/Immunologic: Positive for environmental allergies. Negative for food allergies.   Neurological:  Positive for dizziness and light-headedness. Negative for syncope and weakness.   Hematological:  Bruises/bleeds easily.   Psychiatric/Behavioral:  Positive for sleep disturbance (sleeps to much).      Objective     VITALS: /80 (BP Location: Left arm, Patient Position: Sitting, Cuff Size: Adult)   Pulse 89   Ht 172 cm (67.72\")   Wt (!) 174 kg (384 lb 3.2 oz)   SpO2 97%   BMI " 58.91 kg/m²     LABS:   Lab Results (most recent)       None            IMAGING:   No Images in the past 120 days found..    EXAM:  Physical Exam  Vitals and nursing note reviewed.   Constitutional:       Appearance: She is well-developed.   HENT:      Head: Normocephalic.   Neck:      Thyroid: No thyroid mass.      Vascular: No carotid bruit or JVD.      Trachea: Trachea and phonation normal.   Cardiovascular:      Rate and Rhythm: Normal rate and regular rhythm.      Pulses:           Radial pulses are 2+ on the right side and 2+ on the left side.        Posterior tibial pulses are 2+ on the right side and 2+ on the left side.      Heart sounds: Normal heart sounds. No murmur heard.    No friction rub. No gallop.   Pulmonary:      Effort: Pulmonary effort is normal. No respiratory distress.      Breath sounds: Wheezing present. No rales.   Musculoskeletal:         General: No swelling. Normal range of motion.      Cervical back: Neck supple.   Skin:     General: Skin is warm and dry.      Capillary Refill: Capillary refill takes less than 2 seconds.      Findings: No rash.   Neurological:      Mental Status: She is alert and oriented to person, place, and time.   Psychiatric:         Speech: Speech normal.         Behavior: Behavior normal.         Thought Content: Thought content normal.         Judgment: Judgment normal.       Procedure     ECG 12 Lead    Date/Time: 7/20/2023 2:36 PM  Performed by: Alfonso Swanson APRN  Authorized by: Alfonso Swanson APRN   Comparison: compared with previous ECG from 9/1/2022  Similar to previous ECG  Rhythm: sinus rhythm  Rate: normal  BPM: 91  QRS axis: normal  Other findings: non-specific ST-T wave changes  Comments: QTc 406 ms  No acute changes           Assessment & Plan    Diagnosis Plan   1. Palpitations  ECG 12 Lead      2. Essential hypertension  ECG 12 Lead    olmesartan (BENICAR) 5 MG tablet      3. SOB (shortness of breath)  ECG 12 Lead          Return in about 6  months (around 1/20/2024), or if symptoms worsen or fail to improve.    Diagnoses and all orders for this visit:    1. Palpitations (Primary)  -     ECG 12 Lead    2. Essential hypertension  -     ECG 12 Lead  -     olmesartan (BENICAR) 5 MG tablet; Take 1 tablet by mouth Daily.  Dispense: 90 tablet; Refill: 3    3. SOB (shortness of breath)  -     ECG 12 Lead        PLAN  1. The patient's blood pressure is well controlled today at 125/80 mmHg with a heart rate of 89 beats per minute. She will continue amlodipine 2.5 mg daily, metoprolol succinate  mg, and olmesartan 5 mg daily. A prescription refill for olmesartan (Benicar) 5 mg has been sent.  She will continue to monitor blood pressure on routine basis report any significant highs or lows.  2.  Overall patient feels that if she is improved from a cardiovascular standpoint.  She has improvement of her shortness of breath with treatment of her asthma.  We will continue to monitor from the cardiovascular standpoint.  3.  Due to improvement of symptoms and previous negative stress echo I do think patient would be at an acceptable risk to move forward with bariatric surgery with standard precautions.  She is not on any anticoagulation or antiplatelet therapy that would need to be held.  4.  Informed of signs and symptoms of ACS and advised to seek emergent treatment for any new worsening symptoms.  Patient also advised sooner follow-up as needed.  Also advised to follow-up with family doctor as needed  This note is dictated utilizing voice recognition software.  Although this record has been proof read, transcriptional errors may still be present. If questions occur regarding the content of this record please do not hesitate to call our office.  I have reviewed and confirmed the accuracy of the ROS as documented by the MA/LPN/DELBERT Ortez    Assessment  1. Chronic arterial hypertension  2. Shortness of breath  3. Palpitations    Patient will follow up  in 6 months.    Ike Soria  reports that she has been smoking cigarettes. She has a 7.00 pack-year smoking history. She has never used smokeless tobacco.. I have educated her on the risk of diseases from using tobacco product.         MEDS ORDERED DURING VISIT:  New Medications Ordered This Visit   Medications   • olmesartan (BENICAR) 5 MG tablet     Sig: Take 1 tablet by mouth Daily.     Dispense:  90 tablet     Refill:  3           This document has been electronically signed by DELBERT Valdez Jr.  July 26, 2023 05:16 EDT    Transcribed from ambient dictation for DELBERT Valdez by Mae Ferguson.  07/20/23   16:34 EDT    Patient or patient representative verbalized consent to the visit recording.  I have personally performed the services described in this document as transcribed by the above individual, and it is both accurate and complete.

## 2023-07-20 NOTE — PROGRESS NOTES
Subjective     Ike Soria is a 22 y.o. female who presents to day for Hypertension.    CHIEF COMPLIANT  Chief Complaint   Patient presents with    Hypertension       Active Problems:  Problem List Items Addressed This Visit          Cardiac and Vasculature    Palpitations - Primary    Relevant Orders    ECG 12 Lead       Pulmonary and Pneumonias    SOB (shortness of breath)    Relevant Orders    ECG 12 Lead     Other Visit Diagnoses       Essential hypertension        Relevant Medications    olmesartan (BENICAR) 5 MG tablet    Other Relevant Orders    ECG 12 Lead        PROBLEM LIST:   1. Tachycardia  1.1 Holter; ST with symptoms of fatigue  1.2 EKG; ST, normal axis, EPR, minor NSST-T, no acute ST changes noted.   2. Shortness of Breath  2.1 stress Echo 8/21: No EKG evidence of ischemia with echocardiographic images somewhat equivocal  3. Chest pain    HPI  HPI  Ike Soria is a 22-year-old female patient who is being followed up today for chronic arterial hypertension. The patient is accompanied by an adult female.    The patient does have chronic arterial hypertension where she is on amlodipine, metoprolol, and olmesartan. Today, her blood pressure is 125/80 mmHg, heart rate of 89 beats per minute.    The patient is doing well. She reports she recently twisted her ankle and fell.  She sustained a radial head fracture of her right elbow when she fell. She is going through physical therapy which is painful.    Her shortness of breath has improved a little. She is now taking asthma medication which has helped a little. She denies orthopnea. She no longer experiences shortness of breath when sitting around.    She states that every time she gets into her bed, she will feel palpitations, noting that her bed is tall, and she has to climb into it. She will sit and take a break before she gets into bed. She sleeps with a lot of pillows, so it takes a lot of movement to get right. The palpitations last a few minutes and  occasionally make her dizzy. She denies the palpitations happening during the day.    The patient states she takes naps a lot because she gets tired.    She is in the process of qualifying for bariatric surgery.  Due to her most recent stress echo that had no evidence of ischemia I feel as if patient would be at an acceptable risk to move forth with surgery with standard precautions.  She is on no antiplatelet or anticoagulant therapy to be held.    The patient states her father takes care of her medications.      PRIOR MEDS  Current Outpatient Medications on File Prior to Visit   Medication Sig Dispense Refill    albuterol sulfate  (90 Base) MCG/ACT inhaler Inhale 2 puffs Every 4 (Four) Hours As Needed for Wheezing.      amLODIPine (NORVASC) 2.5 MG tablet TAKE 1 TABLET BY MOUTH DAILY 30 tablet 6    Brexpiprazole (Rexulti) 1 MG tablet Take 1 mg by mouth Every Night.      busPIRone (BUSPAR) 15 MG tablet 1 tablet Daily.      medroxyPROGESTERone (PROVERA) 10 MG tablet Take 1 tablet by mouth Daily.      metoprolol succinate XL (TOPROL-XL) 100 MG 24 hr tablet TAKE 1 TABLET BY MOUTH DAILY 90 tablet 3    omeprazole (priLOSEC) 20 MG capsule TAKE 1 CAPSULE BY MOUTH DAILY 90 capsule 1    tiotropium (SPIRIVA) 18 MCG per inhalation capsule Place 1 capsule into inhaler and inhale Daily.      vilazodone (VIIBRYD) 10 MG tablet tablet Take 1 tablet by mouth Daily.       No current facility-administered medications on file prior to visit.       ALLERGIES  Bee venom, Blueberry flavor, Cinnamon flavor, Coconut flavor, Nuts, Latex, and Penicillins    HISTORY  Past Medical History:   Diagnosis Date    Anxiety and depression     Arrhythmia     Asthma     Fractured elbow 07/2023    Right elbow    Schizo-affective schizophrenia, chronic condition     Tachycardia        Social History     Socioeconomic History    Marital status: Single   Tobacco Use    Smoking status: Every Day     Packs/day: 1.00     Years: 7.00     Pack years: 7.00  "    Types: Cigarettes    Smokeless tobacco: Never   Substance and Sexual Activity    Alcohol use: No    Drug use: No    Sexual activity: Defer       Family History   Problem Relation Age of Onset    Diabetes Mother     Heart attack Mother     Bone cancer Maternal Great-Grandmother        Review of Systems   Constitutional:  Positive for fatigue. Negative for chills and fever.   HENT:  Positive for rhinorrhea. Negative for congestion and sore throat.    Respiratory:  Positive for shortness of breath. Negative for chest tightness.    Cardiovascular:  Positive for palpitations (racing and flutters) and leg swelling (R foot / ankle sprained it). Negative for chest pain.   Gastrointestinal:  Positive for diarrhea. Negative for constipation and nausea.   Musculoskeletal:  Positive for arthralgias and back pain. Negative for neck pain.   Allergic/Immunologic: Positive for environmental allergies. Negative for food allergies.   Neurological:  Positive for dizziness and light-headedness. Negative for syncope and weakness.   Hematological:  Bruises/bleeds easily.   Psychiatric/Behavioral:  Positive for sleep disturbance (sleeps to much).      Objective     VITALS: /80 (BP Location: Left arm, Patient Position: Sitting, Cuff Size: Adult)   Pulse 89   Ht 172 cm (67.72\")   Wt (!) 174 kg (384 lb 3.2 oz)   SpO2 97%   BMI 58.91 kg/m²     LABS:   Lab Results (most recent)       None            IMAGING:   No Images in the past 120 days found..    EXAM:  Physical Exam  Vitals and nursing note reviewed.   Constitutional:       Appearance: She is well-developed.   HENT:      Head: Normocephalic.   Neck:      Thyroid: No thyroid mass.      Vascular: No carotid bruit or JVD.      Trachea: Trachea and phonation normal.   Cardiovascular:      Rate and Rhythm: Normal rate and regular rhythm.      Pulses:           Radial pulses are 2+ on the right side and 2+ on the left side.        Posterior tibial pulses are 2+ on the right side " and 2+ on the left side.      Heart sounds: Normal heart sounds. No murmur heard.    No friction rub. No gallop.   Pulmonary:      Effort: Pulmonary effort is normal. No respiratory distress.      Breath sounds: Wheezing present. No rales.   Musculoskeletal:         General: No swelling. Normal range of motion.      Cervical back: Neck supple.   Skin:     General: Skin is warm and dry.      Capillary Refill: Capillary refill takes less than 2 seconds.      Findings: No rash.   Neurological:      Mental Status: She is alert and oriented to person, place, and time.   Psychiatric:         Speech: Speech normal.         Behavior: Behavior normal.         Thought Content: Thought content normal.         Judgment: Judgment normal.       Procedure     ECG 12 Lead    Date/Time: 7/20/2023 2:36 PM  Performed by: Alfonso Swanson APRN  Authorized by: Alfonso Swanson APRN   Comparison: compared with previous ECG from 9/1/2022  Similar to previous ECG  Rhythm: sinus rhythm  Rate: normal  BPM: 91  QRS axis: normal  Other findings: non-specific ST-T wave changes  Comments: QTc 406 ms  No acute changes           Assessment & Plan    Diagnosis Plan   1. Palpitations  ECG 12 Lead      2. Essential hypertension  ECG 12 Lead    olmesartan (BENICAR) 5 MG tablet      3. SOB (shortness of breath)  ECG 12 Lead          Return in about 6 months (around 1/20/2024), or if symptoms worsen or fail to improve.    Diagnoses and all orders for this visit:    1. Palpitations (Primary)  -     ECG 12 Lead    2. Essential hypertension  -     ECG 12 Lead  -     olmesartan (BENICAR) 5 MG tablet; Take 1 tablet by mouth Daily.  Dispense: 90 tablet; Refill: 3    3. SOB (shortness of breath)  -     ECG 12 Lead        PLAN  1. The patient's blood pressure is well controlled today at 125/80 mmHg with a heart rate of 89 beats per minute. She will continue amlodipine 2.5 mg daily, metoprolol succinate  mg, and olmesartan 5 mg daily. A prescription refill  for olmesartan (Benicar) 5 mg has been sent.  She will continue to monitor blood pressure on routine basis report any significant highs or lows.  2.  Overall patient feels that if she is improved from a cardiovascular standpoint.  She has improvement of her shortness of breath with treatment of her asthma.  We will continue to monitor from the cardiovascular standpoint.  3.  Due to improvement of symptoms and previous negative stress echo I do think patient would be at an acceptable risk to move forward with bariatric surgery with standard precautions.  She is not on any anticoagulation or antiplatelet therapy that would need to be held.  4.  Informed of signs and symptoms of ACS and advised to seek emergent treatment for any new worsening symptoms.  Patient also advised sooner follow-up as needed.  Also advised to follow-up with family doctor as needed  This note is dictated utilizing voice recognition software.  Although this record has been proof read, transcriptional errors may still be present. If questions occur regarding the content of this record please do not hesitate to call our office.  I have reviewed and confirmed the accuracy of the ROS as documented by the MA/LPN/RN DELBERT Valdez    Assessment  1. Chronic arterial hypertension  2. Shortness of breath  3. Palpitations    Patient will follow up in 6 months.    Ike Soria  reports that she has been smoking cigarettes. She has a 7.00 pack-year smoking history. She has never used smokeless tobacco.. I have educated her on the risk of diseases from using tobacco product.         MEDS ORDERED DURING VISIT:  New Medications Ordered This Visit   Medications    olmesartan (BENICAR) 5 MG tablet     Sig: Take 1 tablet by mouth Daily.     Dispense:  90 tablet     Refill:  3           This document has been electronically signed by DELBERT Valdez Jr.  July 26, 2023 05:16 EDT    Transcribed from ambient dictation for DELBERT Valdez by Mae  Marty.  07/20/23   16:34 EDT    Patient or patient representative verbalized consent to the visit recording.  I have personally performed the services described in this document as transcribed by the above individual, and it is both accurate and complete.

## 2023-10-02 DIAGNOSIS — I10 ESSENTIAL HYPERTENSION: ICD-10-CM

## 2023-10-02 RX ORDER — OLMESARTAN MEDOXOMIL 5 MG/1
5 TABLET ORAL DAILY
Qty: 90 TABLET | Refills: 3 | Status: SHIPPED | OUTPATIENT
Start: 2023-10-02

## 2024-01-09 RX ORDER — OMEPRAZOLE 20 MG/1
20 CAPSULE, DELAYED RELEASE ORAL DAILY
Qty: 90 CAPSULE | Refills: 1 | Status: SHIPPED | OUTPATIENT
Start: 2024-01-09

## 2024-02-06 RX ORDER — AMLODIPINE BESYLATE 2.5 MG/1
2.5 TABLET ORAL DAILY
Qty: 30 TABLET | Refills: 6 | Status: SHIPPED | OUTPATIENT
Start: 2024-02-06

## 2024-06-19 ENCOUNTER — TELEPHONE (OUTPATIENT)
Dept: CARDIOLOGY | Facility: CLINIC | Age: 24
End: 2024-06-19

## 2024-06-19 NOTE — TELEPHONE ENCOUNTER
Caller: Dumont MICHAEL    Relationship to patient: Emergency Contact    Best call back number: [phone] (home)     Type of visit: F/U    If rescheduling, when is the original appointment: 6.19.24    PT EMERGENCY CONTACT TOOK NEXT AVAILABLE 9.27.24. PLEASE CALL PT TO DISCUSS IF SHE SHOULD BE SEEN SOONER

## 2024-08-20 DIAGNOSIS — R00.2 PALPITATIONS: ICD-10-CM

## 2024-08-20 DIAGNOSIS — I10 ESSENTIAL HYPERTENSION: ICD-10-CM

## 2024-08-20 RX ORDER — METOPROLOL SUCCINATE 100 MG/1
100 TABLET, EXTENDED RELEASE ORAL DAILY
Qty: 90 TABLET | Refills: 3 | Status: SHIPPED | OUTPATIENT
Start: 2024-08-20

## 2025-03-05 ENCOUNTER — OFFICE VISIT (OUTPATIENT)
Dept: CARDIOLOGY | Facility: CLINIC | Age: 25
End: 2025-03-05
Payer: COMMERCIAL

## 2025-03-05 VITALS
BODY MASS INDEX: 44.41 KG/M2 | HEIGHT: 68 IN | DIASTOLIC BLOOD PRESSURE: 88 MMHG | OXYGEN SATURATION: 96 % | SYSTOLIC BLOOD PRESSURE: 125 MMHG | WEIGHT: 293 LBS | HEART RATE: 109 BPM

## 2025-03-05 DIAGNOSIS — R42 DIZZY: ICD-10-CM

## 2025-03-05 DIAGNOSIS — R07.2 PRECORDIAL PAIN: ICD-10-CM

## 2025-03-05 DIAGNOSIS — R53.82 CHRONIC FATIGUE: ICD-10-CM

## 2025-03-05 DIAGNOSIS — R55 NEAR SYNCOPE: ICD-10-CM

## 2025-03-05 DIAGNOSIS — R06.02 SOB (SHORTNESS OF BREATH): Primary | ICD-10-CM

## 2025-03-05 DIAGNOSIS — R00.2 PALPITATIONS: ICD-10-CM

## 2025-03-05 DIAGNOSIS — I10 ESSENTIAL HYPERTENSION: ICD-10-CM

## 2025-03-05 PROCEDURE — 99214 OFFICE O/P EST MOD 30 MIN: CPT | Performed by: NURSE PRACTITIONER

## 2025-03-05 PROCEDURE — 1160F RVW MEDS BY RX/DR IN RCRD: CPT | Performed by: NURSE PRACTITIONER

## 2025-03-05 PROCEDURE — 1159F MED LIST DOCD IN RCRD: CPT | Performed by: NURSE PRACTITIONER

## 2025-03-05 PROCEDURE — 93000 ELECTROCARDIOGRAM COMPLETE: CPT | Performed by: NURSE PRACTITIONER

## 2025-03-05 RX ORDER — TIRZEPATIDE 7.5 MG/.5ML
INJECTION, SOLUTION SUBCUTANEOUS
COMMUNITY
Start: 2025-03-03

## 2025-03-05 RX ORDER — METOPROLOL SUCCINATE 100 MG/1
100 TABLET, EXTENDED RELEASE ORAL DAILY
Qty: 90 TABLET | Refills: 3 | Status: SHIPPED | OUTPATIENT
Start: 2025-03-05

## 2025-03-05 RX ORDER — OLMESARTAN MEDOXOMIL 5 MG/1
5 TABLET ORAL DAILY
Qty: 90 TABLET | Refills: 3 | Status: SHIPPED | OUTPATIENT
Start: 2025-03-05

## 2025-03-05 NOTE — PROGRESS NOTES
Subjective     Ike Soria is a 24 y.o. female who presents to day for Follow-up, Hypertension, and Palpitations.    CHIEF COMPLIANT  Chief Complaint   Patient presents with    Follow-up    Hypertension    Palpitations       Active Problems:  Problem List Items Addressed This Visit          Cardiac and Vasculature    Palpitations    Relevant Medications    metoprolol succinate XL (TOPROL-XL) 100 MG 24 hr tablet    Other Relevant Orders    ECG 12 Lead    Duplex Carotid Ultrasound CAR    Stress Test With Myocardial Perfusion One Day    Holter Monitor - 72 Hour Up To 15 Days       Pulmonary and Pneumonias    SOB (shortness of breath) - Primary    Relevant Orders    ECG 12 Lead    Duplex Carotid Ultrasound CAR    Stress Test With Myocardial Perfusion One Day    Holter Monitor - 72 Hour Up To 15 Days     Other Visit Diagnoses         Essential hypertension        Relevant Medications    metoprolol succinate XL (TOPROL-XL) 100 MG 24 hr tablet    olmesartan (BENICAR) 5 MG tablet    Other Relevant Orders    ECG 12 Lead    Duplex Carotid Ultrasound CAR    Stress Test With Myocardial Perfusion One Day    Holter Monitor - 72 Hour Up To 15 Days      Precordial pain        Relevant Orders    ECG 12 Lead    Duplex Carotid Ultrasound CAR    Stress Test With Myocardial Perfusion One Day    Holter Monitor - 72 Hour Up To 15 Days      Near syncope        Relevant Orders    ECG 12 Lead    Duplex Carotid Ultrasound CAR    Stress Test With Myocardial Perfusion One Day    Holter Monitor - 72 Hour Up To 15 Days      Dizzy        Relevant Orders    ECG 12 Lead    Duplex Carotid Ultrasound CAR    Stress Test With Myocardial Perfusion One Day    Holter Monitor - 72 Hour Up To 15 Days      Chronic fatigue        Relevant Orders    ECG 12 Lead    Duplex Carotid Ultrasound CAR    Stress Test With Myocardial Perfusion One Day    Holter Monitor - 72 Hour Up To 15 Days        PROBLEM LIST:   1. Tachycardia  1.1 Holter; ST with symptoms of  fatigue  1.2 EKG; ST, normal axis, EPR, minor NSST-T, no acute ST changes noted.   2. Shortness of Breath  2.1 stress Echo 8/21: No EKG evidence of ischemia with echocardiographic images somewhat equivocal  3. Chest pain  4.  Diabetes mellitus    HPI  HPI  Ike Wilson is a 24-year-old female patient who is being followed up today for chronic arterial hypertension. The patient is accompanied by an adult male.    Patient does have a history of chronic arterial hypertension in which she is being treated with olmesartan and metoprolol.  Today her blood pressure is 125/89 with a heart rate of 109.    Patient does report chest pain which is intermittent pain that occurs in her chest and radiates down her left arm at times.  This occurs on a daily basis last anywhere from a few seconds to approximately 10 minutes.  Minimal activity causes her to develop the chest pain.  She also can experience the chest pain at rest.  She does have associated diaphoresis.  It is so severe at times she just has to sit and rest.    Patient also reports episodes where she feels like she is going to pass out.  They occur at random.  Says she will just get dizzy diaphoretic and developed tunnel vision.  This can occur even while she is lying in bed.  She says this occurred approximately 4-5 times in the last couple months.  She says that she will also have associated heaviness in her chest.  At times she will stand up out of bed and she will have to drop back to the floor or if she feels as if she would pass out.  She will see black spots and can see her heartbeat or pulsation in her eyes.    Patient does report that her heart rate will increase quite significantly with any activity whatsoever.    Patient does report fatigue in which she wants to sleep all the time.  Says she is always tired.  She has to take at least 2 naps during the day to function.    Patient also reports that she has been newly diagnosed with diabetes mellitus  PRIOR  MEDS  Current Outpatient Medications on File Prior to Visit   Medication Sig Dispense Refill    albuterol sulfate  (90 Base) MCG/ACT inhaler Inhale 2 puffs Every 4 (Four) Hours As Needed for Wheezing.      Brexpiprazole (Rexulti) 1 MG tablet Take 1 tablet by mouth Every Night.      busPIRone (BUSPAR) 15 MG tablet 1 tablet Daily.      medroxyPROGESTERone (PROVERA) 10 MG tablet Take 1 tablet by mouth Daily.      Mounjaro 7.5 MG/0.5ML solution auto-injector       omeprazole (priLOSEC) 20 MG capsule TAKE 1 CAPSULE BY MOUTH DAILY 90 capsule 1    tiotropium (SPIRIVA) 18 MCG per inhalation capsule Place 1 capsule into inhaler and inhale Daily.      vilazodone (VIIBRYD) 10 MG tablet tablet Take 1 tablet by mouth Daily.       No current facility-administered medications on file prior to visit.       ALLERGIES  Bee venom, Blueberry flavoring agent (non-screening), Cinnamon flavoring agent (non-screening), Coconut flavoring agent (non-screening), Nuts, Latex, and Penicillins    HISTORY  Past Medical History:   Diagnosis Date    Anxiety and depression     Arrhythmia     Asthma     Fractured elbow 07/2023    Right elbow    Schizo-affective schizophrenia, chronic condition     Tachycardia        Social History     Socioeconomic History    Marital status: Single   Tobacco Use    Smoking status: Every Day     Current packs/day: 1.00     Average packs/day: 1 pack/day for 7.0 years (7.0 ttl pk-yrs)     Types: Cigarettes    Smokeless tobacco: Never   Substance and Sexual Activity    Alcohol use: No    Drug use: No    Sexual activity: Defer       Family History   Problem Relation Age of Onset    Diabetes Mother     Heart attack Mother     Bone cancer Maternal Great-Grandmother        Review of Systems   Constitutional:  Positive for fatigue. Negative for chills and fever.   HENT:  Positive for rhinorrhea. Negative for congestion and sore throat.    Eyes:  Negative for visual disturbance.   Respiratory:  Positive for apnea (Can  "not tolerate mask causes a panic attack), chest tightness (heaviness on the left side of chest) and shortness of breath (with rest and activity).    Cardiovascular:  Positive for chest pain (has some pain with activity), palpitations and leg swelling (feet swell).   Gastrointestinal:  Positive for diarrhea. Negative for constipation and nausea.   Musculoskeletal:  Positive for arthralgias (right hip) and back pain (mid back). Negative for neck pain.   Skin:  Positive for rash (under breasts). Negative for wound.   Allergic/Immunologic: Positive for environmental allergies and food allergies (blueberry , cinnamon, cocounut, and nuts).   Neurological:  Positive for dizziness (she also gets tunnel vision with getting up or with walking) and weakness (feels like she is going topass out but has not). Negative for syncope and light-headedness.   Hematological:  Bruises/bleeds easily (bruise).   Psychiatric/Behavioral:  Positive for sleep disturbance (sleeps all the time is tired all the time).        Objective     VITALS: /88 (BP Location: Left arm, Patient Position: Standing, Cuff Size: Adult)   Pulse 109   Ht 172.7 cm (68\")   Wt (!) 157 kg (345 lb 12.8 oz)   SpO2 96%   BMI 52.58 kg/m²     LABS:   Lab Results (most recent)       None            IMAGING:   No Images in the past 120 days found..    EXAM:  Physical Exam  Vitals and nursing note reviewed.   Constitutional:       Appearance: She is well-developed.   HENT:      Head: Normocephalic.   Neck:      Thyroid: No thyroid mass.      Vascular: No carotid bruit or JVD.      Trachea: Trachea and phonation normal.   Cardiovascular:      Rate and Rhythm: Normal rate and regular rhythm.      Pulses:           Radial pulses are 2+ on the right side and 2+ on the left side.        Posterior tibial pulses are 2+ on the right side and 2+ on the left side.      Heart sounds: Normal heart sounds. No murmur heard.     No friction rub. No gallop.   Pulmonary:      " Effort: Pulmonary effort is normal. No respiratory distress.      Breath sounds: Normal breath sounds. No wheezing or rales.   Musculoskeletal:         General: No swelling. Normal range of motion.      Cervical back: Neck supple.   Skin:     General: Skin is warm and dry.      Capillary Refill: Capillary refill takes less than 2 seconds.      Findings: No rash.   Neurological:      Mental Status: She is alert and oriented to person, place, and time.   Psychiatric:         Speech: Speech normal.         Behavior: Behavior normal.         Thought Content: Thought content normal.         Judgment: Judgment normal.         Procedure     ECG 12 Lead    Date/Time: 3/5/2025 4:16 PM  Performed by: Alfonso Swanson APRN    Authorized by: Alfonso Swanson APRN  Comparison: compared with previous ECG from 7/20/2023  Similar to previous ECG  Rhythm: sinus rhythm  Rate: normal  BPM: 80  QRS axis: normal  Other findings: non-specific ST-T wave changes  Comments: QTc 440 ms  No acute changes             Assessment & Plan    Diagnosis Plan   1. SOB (shortness of breath)  ECG 12 Lead    Duplex Carotid Ultrasound CAR    Stress Test With Myocardial Perfusion One Day    Holter Monitor - 72 Hour Up To 15 Days      2. Palpitations  ECG 12 Lead    Duplex Carotid Ultrasound CAR    Stress Test With Myocardial Perfusion One Day    Holter Monitor - 72 Hour Up To 15 Days    metoprolol succinate XL (TOPROL-XL) 100 MG 24 hr tablet      3. Essential hypertension  ECG 12 Lead    Duplex Carotid Ultrasound CAR    Stress Test With Myocardial Perfusion One Day    Holter Monitor - 72 Hour Up To 15 Days    metoprolol succinate XL (TOPROL-XL) 100 MG 24 hr tablet    olmesartan (BENICAR) 5 MG tablet      4. Precordial pain  ECG 12 Lead    Duplex Carotid Ultrasound CAR    Stress Test With Myocardial Perfusion One Day    Holter Monitor - 72 Hour Up To 15 Days      5. Near syncope  ECG 12 Lead    Duplex Carotid Ultrasound CAR    Stress Test With Myocardial  Perfusion One Day    Holter Monitor - 72 Hour Up To 15 Days      6. Dizzy  ECG 12 Lead    Duplex Carotid Ultrasound CAR    Stress Test With Myocardial Perfusion One Day    Holter Monitor - 72 Hour Up To 15 Days      7. Chronic fatigue  ECG 12 Lead    Duplex Carotid Ultrasound CAR    Stress Test With Myocardial Perfusion One Day    Holter Monitor - 72 Hour Up To 15 Days      1.  Given patient's continued symptoms of chest pain, shortness of breath and near syncope I do think she needs to go under repeat cardiovascular workup including stress test.  She does not think she can form the treadmill portion of the stress test due to her musculoskeletal issues with her knees.  She also has a BMI of 52.58 with a for severely reduced functional capacity.  2.  Patient's blood pressure is controlled on current blood pressure medication regimen.  Due to what sounds like orthostatic dizziness we will discontinue her amlodipine..  Patient advised to monitor blood pressure on a daily basis and report any persistent highs or lows.  Set goal blood pressure for patient at 130/80 or below.  3.  Due to patient's near syncope and palpitations I like for her to wear a Holter monitor 14 days to help determine the etiology of her palpitations as well as her syncope.  Given the fact that she does not become syncopal on a daily basis I do think a 14-day monitor would be more appropriate given a shorter monitor would likely not identify the potential causes of her near syncope.  4.  Would also like to get a carotid duplex to rule out carotid artery disease as a potential cause of her symptoms.  5.  Informed of signs and symptoms of ACS and advised to seek emergent treatment for any new worsening symptoms.  Patient also advised sooner follow-up as needed.  Also advised to follow-up with family doctor as needed  This note is dictated utilizing voice recognition software.  Although this record has been proof read, transcriptional errors may still  be present. If questions occur regarding the content of this record please do not hesitate to call our office.  I have reviewed and confirmed the accuracy of the ROS as documented by the MA/LPN/RN DELBERT Valdez    Return if symptoms worsen or fail to improve, for Next scheduled follow up.    Diagnoses and all orders for this visit:    1. SOB (shortness of breath) (Primary)  -     ECG 12 Lead  -     Duplex Carotid Ultrasound CAR; Future  -     Stress Test With Myocardial Perfusion One Day; Future  -     Holter Monitor - 72 Hour Up To 15 Days; Future    2. Palpitations  -     ECG 12 Lead  -     Duplex Carotid Ultrasound CAR; Future  -     Stress Test With Myocardial Perfusion One Day; Future  -     Holter Monitor - 72 Hour Up To 15 Days; Future  -     metoprolol succinate XL (TOPROL-XL) 100 MG 24 hr tablet; Take 1 tablet by mouth Daily.  Dispense: 90 tablet; Refill: 3    3. Essential hypertension  -     ECG 12 Lead  -     Duplex Carotid Ultrasound CAR; Future  -     Stress Test With Myocardial Perfusion One Day; Future  -     Holter Monitor - 72 Hour Up To 15 Days; Future  -     metoprolol succinate XL (TOPROL-XL) 100 MG 24 hr tablet; Take 1 tablet by mouth Daily.  Dispense: 90 tablet; Refill: 3  -     olmesartan (BENICAR) 5 MG tablet; Take 1 tablet by mouth Daily.  Dispense: 90 tablet; Refill: 3    4. Precordial pain  -     ECG 12 Lead  -     Duplex Carotid Ultrasound CAR; Future  -     Stress Test With Myocardial Perfusion One Day; Future  -     Holter Monitor - 72 Hour Up To 15 Days; Future    5. Near syncope  -     ECG 12 Lead  -     Duplex Carotid Ultrasound CAR; Future  -     Stress Test With Myocardial Perfusion One Day; Future  -     Holter Monitor - 72 Hour Up To 15 Days; Future    6. Dizzy  -     ECG 12 Lead  -     Duplex Carotid Ultrasound CAR; Future  -     Stress Test With Myocardial Perfusion One Day; Future  -     Holter Monitor - 72 Hour Up To 15 Days; Future    7. Chronic fatigue  -     ECG 12  Lead  -     Duplex Carotid Ultrasound CAR; Future  -     Stress Test With Myocardial Perfusion One Day; Future  -     Holter Monitor - 72 Hour Up To 15 Days; Future        Ike Soria  reports that she has been smoking cigarettes. She has a 7 pack-year smoking history. She has never used smokeless tobacco. I have educated her on the risk of diseases from using tobacco products such as cancer, COPD, and heart disease. Patient does smoke a pack a day .    I advised her to quit and she is willing to quit. I spent 5.5 minutes counseling the patient.           Class 3 Severe Obesity (BMI >=40). Obesity-related health conditions include the following: obstructive sleep apnea Dm . We have discussed portion control and increasing exercise.           MEDS ORDERED DURING VISIT:  New Medications Ordered This Visit   Medications    metoprolol succinate XL (TOPROL-XL) 100 MG 24 hr tablet     Sig: Take 1 tablet by mouth Daily.     Dispense:  90 tablet     Refill:  3    olmesartan (BENICAR) 5 MG tablet     Sig: Take 1 tablet by mouth Daily.     Dispense:  90 tablet     Refill:  3           This document has been electronically signed by Alfonso Swanson Jr., APRN  March 10, 2025 08:41 EDT

## 2025-03-06 ENCOUNTER — TELEPHONE (OUTPATIENT)
Dept: CARDIOLOGY | Facility: CLINIC | Age: 25
End: 2025-03-06
Payer: COMMERCIAL

## 2025-03-06 NOTE — TELEPHONE ENCOUNTER
Caller: Ike Soria    Relationship: Self    Best call back number: [phone]    What is the best time to reach you: ANY    What was the call regarding: PATIENT ADVISING SHE PUSHED A BUTTON ON THE MONITOR AND IT SHOCKED HER. IS THIS NORMAL? PLEASE CALL HER TO DISCUSS.     Is it okay if the provider responds through MyChart: NO

## 2025-04-14 ENCOUNTER — HOSPITAL ENCOUNTER (OUTPATIENT)
Dept: CARDIOLOGY | Facility: HOSPITAL | Age: 25
Discharge: HOME | End: 2025-04-14
Payer: COMMERCIAL

## 2025-04-14 DIAGNOSIS — R07.2 PRECORDIAL PAIN: ICD-10-CM

## 2025-04-14 DIAGNOSIS — I10 ESSENTIAL HYPERTENSION: ICD-10-CM

## 2025-04-14 DIAGNOSIS — R55 NEAR SYNCOPE: ICD-10-CM

## 2025-04-14 DIAGNOSIS — R42 DIZZY: ICD-10-CM

## 2025-04-14 DIAGNOSIS — R06.02 SOB (SHORTNESS OF BREATH): ICD-10-CM

## 2025-04-14 DIAGNOSIS — R00.2 PALPITATIONS: ICD-10-CM

## 2025-04-14 DIAGNOSIS — R53.82 CHRONIC FATIGUE: ICD-10-CM

## 2025-04-14 PROCEDURE — A9500 TC99M SESTAMIBI: HCPCS | Performed by: INTERNAL MEDICINE

## 2025-04-14 PROCEDURE — 34310000005 TECHNETIUM SESTAMIBI: Performed by: INTERNAL MEDICINE

## 2025-04-14 PROCEDURE — 78452 HT MUSCLE IMAGE SPECT MULT: CPT

## 2025-04-14 PROCEDURE — 25010000002 REGADENOSON 0.4 MG/5ML SOLUTION: Performed by: INTERNAL MEDICINE

## 2025-04-14 PROCEDURE — 93880 EXTRACRANIAL BILAT STUDY: CPT

## 2025-04-14 PROCEDURE — 93017 CV STRESS TEST TRACING ONLY: CPT

## 2025-04-14 RX ORDER — REGADENOSON 0.08 MG/ML
0.4 INJECTION, SOLUTION INTRAVENOUS
Status: COMPLETED | OUTPATIENT
Start: 2025-04-14 | End: 2025-04-14

## 2025-04-14 RX ADMIN — TECHNETIUM TC 99M SESTAMIBI 1 DOSE: 1 INJECTION INTRAVENOUS at 08:39

## 2025-04-14 RX ADMIN — REGADENOSON 0.4 MG: 0.08 INJECTION, SOLUTION INTRAVENOUS at 10:45

## 2025-04-14 RX ADMIN — TECHNETIUM TC 99M SESTAMIBI 1 DOSE: 1 INJECTION INTRAVENOUS at 10:45

## 2025-04-19 LAB
BH CV REST NUCLEAR ISOTOPE DOSE: 10 MCI
BH CV STRESS COMMENTS STAGE 1: NORMAL
BH CV STRESS DOSE REGADENOSON STAGE 1: 0.4
BH CV STRESS DURATION MIN STAGE 1: 0
BH CV STRESS DURATION SEC STAGE 1: 10
BH CV STRESS NUCLEAR ISOTOPE DOSE: 30 MCI
BH CV STRESS PROTOCOL 1: NORMAL
BH CV STRESS RECOVERY BP: NORMAL MMHG
BH CV STRESS RECOVERY HR: 108 BPM
BH CV STRESS STAGE 1: 1
BH CV XLRA MEAS LEFT CAROTID BULB EDV: 30.7 CM/SEC
BH CV XLRA MEAS LEFT CAROTID BULB PSV: 130 CM/SEC
BH CV XLRA MEAS LEFT DIST CCA EDV: 28.5 CM/SEC
BH CV XLRA MEAS LEFT DIST CCA PSV: 135 CM/SEC
BH CV XLRA MEAS LEFT DIST ICA EDV: -32.9 CM/SEC
BH CV XLRA MEAS LEFT DIST ICA PSV: -101 CM/SEC
BH CV XLRA MEAS LEFT ICA/CCA RATIO: 1
BH CV XLRA MEAS LEFT MID ICA EDV: -32.9 CM/SEC
BH CV XLRA MEAS LEFT MID ICA PSV: -97.8 CM/SEC
BH CV XLRA MEAS LEFT PROX CCA EDV: 34 CM/SEC
BH CV XLRA MEAS LEFT PROX CCA PSV: 160 CM/SEC
BH CV XLRA MEAS LEFT PROX ECA PSV: -143 CM/SEC
BH CV XLRA MEAS LEFT PROX ICA EDV: -26.3 CM/SEC
BH CV XLRA MEAS LEFT PROX ICA PSV: -133 CM/SEC
BH CV XLRA MEAS LEFT VERTEBRAL A EDV: 19.9 CM/SEC
BH CV XLRA MEAS LEFT VERTEBRAL A PSV: 65.9 CM/SEC
BH CV XLRA MEAS RIGHT CAROTID BULB EDV: -20.3 CM/SEC
BH CV XLRA MEAS RIGHT CAROTID BULB PSV: -111 CM/SEC
BH CV XLRA MEAS RIGHT DIST CCA EDV: -25.2 CM/SEC
BH CV XLRA MEAS RIGHT DIST CCA PSV: -147 CM/SEC
BH CV XLRA MEAS RIGHT DIST ICA EDV: -37.6 CM/SEC
BH CV XLRA MEAS RIGHT DIST ICA PSV: -129 CM/SEC
BH CV XLRA MEAS RIGHT ICA/CCA RATIO: 0.9
BH CV XLRA MEAS RIGHT MID ICA EDV: -35.8 CM/SEC
BH CV XLRA MEAS RIGHT MID ICA PSV: -126 CM/SEC
BH CV XLRA MEAS RIGHT PROX CCA EDV: 26.1 CM/SEC
BH CV XLRA MEAS RIGHT PROX CCA PSV: 160 CM/SEC
BH CV XLRA MEAS RIGHT PROX ECA PSV: -136 CM/SEC
BH CV XLRA MEAS RIGHT PROX ICA EDV: -30 CM/SEC
BH CV XLRA MEAS RIGHT PROX ICA PSV: -131 CM/SEC
BH CV XLRA MEAS RIGHT VERTEBRAL A EDV: 15.5 CM/SEC
BH CV XLRA MEAS RIGHT VERTEBRAL A PSV: 55.3 CM/SEC
MAXIMAL PREDICTED HEART RATE: 194 BPM
PERCENT MAX PREDICTED HR: 68.56 %
STRESS BASELINE BP: NORMAL MMHG
STRESS BASELINE HR: 102 BPM
STRESS PERCENT HR: 81 %
STRESS POST PEAK BP: NORMAL MMHG
STRESS POST PEAK HR: 133 BPM
STRESS TARGET HR: 165 BPM

## 2025-04-21 ENCOUNTER — RESULTS FOLLOW-UP (OUTPATIENT)
Dept: CARDIOLOGY | Facility: CLINIC | Age: 25
End: 2025-04-21
Payer: COMMERCIAL

## 2025-04-21 NOTE — TELEPHONE ENCOUNTER
----- Message from Farrah DE LA FUENTE sent at 4/21/2025 10:45 AM EDT -----  Regarding: FW:    ----- Message -----  From: Alfonso Swanson APRN  Sent: 4/20/2025  10:49 PM EDT  To: Kae Dennis MA  Subject: FW:                                              No evidence of hemodynamically significant carotid disease.  Keep follow up  ----- Message -----  From: Yuriy Ly MD  Sent: 4/19/2025   5:17 PM EDT  To: DELBERT Valdez

## 2025-05-01 ENCOUNTER — TELEPHONE (OUTPATIENT)
Dept: CARDIOLOGY | Facility: CLINIC | Age: 25
End: 2025-05-01
Payer: COMMERCIAL

## 2025-05-01 NOTE — TELEPHONE ENCOUNTER
Referral states patient is needing an appointment for hypotension and dizzness. Called patient to get more information to address with provider. LVM to return call.

## 2025-05-02 NOTE — TELEPHONE ENCOUNTER
Spoke to patient she has been to er for dizzness and request sooner appointment. Patient is aware of sooner appointment and records indexed into chart.

## 2025-05-08 ENCOUNTER — OFFICE VISIT (OUTPATIENT)
Dept: CARDIOLOGY | Facility: CLINIC | Age: 25
End: 2025-05-08
Payer: COMMERCIAL

## 2025-05-08 VITALS
OXYGEN SATURATION: 97 % | HEART RATE: 96 BPM | HEIGHT: 68 IN | SYSTOLIC BLOOD PRESSURE: 112 MMHG | BODY MASS INDEX: 44.41 KG/M2 | WEIGHT: 293 LBS | DIASTOLIC BLOOD PRESSURE: 75 MMHG

## 2025-05-08 DIAGNOSIS — R00.0 TACHYCARDIA: Primary | ICD-10-CM

## 2025-05-08 DIAGNOSIS — R07.2 PRECORDIAL PAIN: ICD-10-CM

## 2025-05-08 DIAGNOSIS — I10 ESSENTIAL HYPERTENSION: ICD-10-CM

## 2025-05-08 DIAGNOSIS — R06.02 SOB (SHORTNESS OF BREATH): ICD-10-CM

## 2025-05-08 DIAGNOSIS — F17.200 SMOKER: ICD-10-CM

## 2025-05-08 DIAGNOSIS — R00.2 PALPITATIONS: ICD-10-CM

## 2025-05-08 PROCEDURE — 99214 OFFICE O/P EST MOD 30 MIN: CPT | Performed by: INTERNAL MEDICINE

## 2025-05-08 RX ORDER — ASPIRIN 81 MG/1
81 TABLET ORAL DAILY
COMMUNITY

## 2025-05-08 RX ORDER — METOPROLOL SUCCINATE 25 MG/1
25 TABLET, EXTENDED RELEASE ORAL DAILY
Qty: 30 TABLET | Refills: 11 | Status: SHIPPED | OUTPATIENT
Start: 2025-05-08

## 2025-05-08 RX ORDER — FLUTICASONE PROPIONATE AND SALMETEROL 50; 500 UG/1; UG/1
1 POWDER RESPIRATORY (INHALATION)
COMMUNITY
Start: 2025-04-29

## 2025-05-08 RX ORDER — FERROUS SULFATE 325(65) MG
325 TABLET ORAL
COMMUNITY

## 2025-05-08 RX ORDER — BREXPIPRAZOLE 2 MG/1
TABLET ORAL DAILY
COMMUNITY
Start: 2025-05-07

## 2025-05-08 NOTE — PROGRESS NOTES
Subjective   Ike Soria is a 24 y.o. female     Chief Complaint   Patient presents with    Follow-up     Here for ER f/u    Dizziness    Chest Pain    Numbness       PROBLEM LIST:     1. Tachycardia  1.1 Holter; NSR, couplets / triplets  2. Shortness of Breath  3. Chest pain  3.1 Stress test, 4-. No ischemia  4.  Diabetes mellitus  5. MARIAN, unable to use machine due to anxiety  6. Echo, 10-5-2022. EF 60-65%, trivial TR  7. Schizophrenia  8. Smoker    Specialty Problems          Cardiology Problems    Palpitations        Tachycardia             HPI:  Ms. Evangelista returns for follow-up on the above.    She has had a stress test in carotid duplex since the time of her last visit.  She also had a visit to the emergency room at [facility] because she went bananas-she could smell and taste bananas without any actual stimulus.  She was found to have no acute intracranial pathology and was discharged.    Event monitor demonstrated rare PACs and PVCs with no sustained ectopy.  Symptoms occurred during a sinus mechanism.    Carotid duplex demonstrated no obstructive disease with antegrade flow in both vertebral arteries.    Stress test demonstrated no evidence of ischemia with an ejection fraction of 48%.                        PRIOR MEDICATIONS    Current Outpatient Medications on File Prior to Visit   Medication Sig Dispense Refill    Advair Diskus 500-50 MCG/ACT DISKUS Inhale 1 puff 2 (Two) Times a Day.      albuterol sulfate  (90 Base) MCG/ACT inhaler Inhale 2 puffs Every 4 (Four) Hours As Needed for Wheezing.      aspirin 81 MG EC tablet Take 1 tablet by mouth Daily.      busPIRone (BUSPAR) 15 MG tablet 1 tablet Daily.      ferrous sulfate 325 (65 FE) MG tablet Take 1 tablet by mouth 3 (Three) Times a Day With Meals.      metoprolol succinate XL (TOPROL-XL) 100 MG 24 hr tablet Take 1 tablet by mouth Daily. (Patient taking differently: Take 0.5 tablets by mouth Daily.) 90 tablet 3     Mounjaro 7.5 MG/0.5ML solution auto-injector       olmesartan (BENICAR) 5 MG tablet Take 1 tablet by mouth Daily. 90 tablet 3    omeprazole (priLOSEC) 20 MG capsule TAKE 1 CAPSULE BY MOUTH DAILY 90 capsule 1    Rexulti 2 MG tablet Daily.      tiotropium (SPIRIVA) 18 MCG per inhalation capsule Place 1 capsule into inhaler and inhale Daily.      vilazodone (VIIBRYD) 10 MG tablet tablet Take 1 tablet by mouth Daily.      [DISCONTINUED] Brexpiprazole (Rexulti) 1 MG tablet Take 1 tablet by mouth Every Night.      [DISCONTINUED] medroxyPROGESTERone (PROVERA) 10 MG tablet Take 1 tablet by mouth Daily.       No current facility-administered medications on file prior to visit.       ALLERGIES:    Bee venom, Blueberry flavoring agent (non-screening), Cinnamon flavoring agent (non-screening), Coconut flavoring agent (non-screening), Nuts, Latex, and Penicillins    PAST MEDICAL HISTORY:    Past Medical History:   Diagnosis Date    Anxiety and depression     Arrhythmia     Asthma     Fractured elbow 07/2023    Right elbow    Schizo-affective schizophrenia, chronic condition     Tachycardia        SURGICAL HISTORY:    Past Surgical History:   Procedure Laterality Date    BONE MARROW BIOPSY      CHOLECYSTECTOMY      KIDNEY STONE SURGERY      MOUTH SURGERY      TONSILLECTOMY AND ADENOIDECTOMY      TUMOR EXCISION      LEFT EAR-non cancerous    WART REMOVAL         SOCIAL HISTORY:    Social History     Socioeconomic History    Marital status: Single   Tobacco Use    Smoking status: Every Day     Current packs/day: 1.00     Average packs/day: 1 pack/day for 7.0 years (7.0 ttl pk-yrs)     Types: Cigarettes    Smokeless tobacco: Never   Substance and Sexual Activity    Alcohol use: No    Drug use: No    Sexual activity: Defer       FAMILY HISTORY:    Family History   Problem Relation Age of Onset    Diabetes Mother     Heart attack Mother     Bone cancer Maternal Great-Grandmother        Review of Systems   Constitutional:  Positive for  "diaphoresis (night) and fatigue. Negative for chills and fever.   HENT: Negative.     Eyes:  Positive for visual disturbance (glasses).   Respiratory:  Positive for shortness of breath.    Cardiovascular:  Positive for chest pain, palpitations and leg swelling.   Gastrointestinal:  Positive for diarrhea. Negative for blood in stool and constipation.   Endocrine: Negative.    Genitourinary: Negative.    Musculoskeletal: Negative.    Skin: Negative.    Allergic/Immunologic: Positive for environmental allergies and food allergies.   Neurological:  Positive for dizziness and light-headedness. Negative for syncope.   Hematological:  Bruises/bleeds easily.   Psychiatric/Behavioral: Negative.         VISIT VITALS:  Vitals:    05/08/25 1151 05/08/25 1152 05/08/25 1153   BP:  114/80 112/75   BP Location:  Left arm Left arm   Patient Position:  Sitting Standing   Pulse:  89 96   SpO2: 97%     Weight: (!) 152 kg (335 lb)     Height: 172.7 cm (67.99\")        /75 (BP Location: Left arm, Patient Position: Standing) Comment: lightheadedness from lying to sitting  Pulse 96   Ht 172.7 cm (67.99\")   Wt (!) 152 kg (335 lb)   SpO2 97%   BMI 50.95 kg/m²     RECENT LABS:    Objective       Physical Exam  Vitals and nursing note reviewed.   Constitutional:       General: She is not in acute distress.     Appearance: She is well-developed.   HENT:      Head: Normocephalic and atraumatic.   Eyes:      Conjunctiva/sclera: Conjunctivae normal.      Pupils: Pupils are equal, round, and reactive to light.   Neck:      Vascular: No carotid bruit, hepatojugular reflux or JVD.      Trachea: No tracheal deviation.      Comments: Nl. Carotid upstrokes  Cardiovascular:      Rate and Rhythm: Normal rate and regular rhythm.      Pulses:           Radial pulses are 2+ on the right side and 2+ on the left side.      Heart sounds: Normal heart sounds, S1 normal and S2 normal. Heart sounds are distant.   Pulmonary:      Effort: Pulmonary effort " is normal.      Breath sounds: Normal breath sounds. No wheezing, rhonchi or rales.      Comments: Nl. Expir. Phase  Nl. Breath sound intensity  Abdominal:      General: Bowel sounds are normal. There is no distension or abdominal bruit.      Palpations: Abdomen is soft. There is no mass.      Tenderness: There is no abdominal tenderness. There is no guarding or rebound.      Comments: No organomegaly   Musculoskeletal:         General: No tenderness or deformity. Normal range of motion.      Cervical back: Normal range of motion and neck supple.      Right lower leg: No edema.      Left lower leg: No edema.      Comments: LLE, no edema, palpable pedal pulses  RLE, no edema, palpable pedal pulses  Cap. Refills 2 sec.    Skin:     General: Skin is warm and dry.      Coloration: Skin is not pale.      Findings: No erythema or rash.   Neurological:      Mental Status: She is alert and oriented to person, place, and time.   Psychiatric:         Behavior: Behavior normal.         Thought Content: Thought content normal.         Judgment: Judgment normal.         Procedures      Assessment & Plan   #1.  Chest pain.  The patient described chest pain atypical for angina and had no evidence of ischemia on recent stress testing.  We will continue clinical monitoring and the patient was asked to follow-up with Triny Larios regarding noncardiac causes of her ongoing symptoms.    2.  Tachycardia.  The patient has been minimally symptomatic of tacky dysrhythmia.  However, she does have significant orthostatic symptomatology.  We will trial decreasing metoprolol to 25 mg of succinate daily.    3.  Exertional dyspnea.  Symptoms are stable.  I see no significant cardiac contributor to those symptoms based on recent testing.    4.  The patient will follow with Triny Larios as instructed and we will plan on seeing her in follow-up in August as previously scheduled.   Diagnosis Plan   1. Tachycardia        2. Palpitations        3.  SOB (shortness of breath)        4. Precordial pain        5. Smoker            No follow-ups on file.         Ike Soria  reports that she has been smoking cigarettes. She has a 7 pack-year smoking history. She has never used smokeless tobacco. I have educated her on the risk of diseases from using tobacco products such as cancer, COPD, and heart disease.                 DO YOU VAPE? NO                    Electronically signed by:    Scribed for Yuriy Ly MD by Shilpa Gómez LPN on May 8, 2025  at 13:01 EDT    I, Yuriy Ly MD personally performed the services described in this documentation as scribed by the above named individual in my presence, and it is both accurate and complete. May 8, 2025 13:01 EDT      Dictated Utilizing Dragon Dictation: Part of this note may be an electronic transcription/translation of spoken language to printed text using the Dragon Dictation System.

## 2025-08-13 ENCOUNTER — OFFICE VISIT (OUTPATIENT)
Dept: CARDIOLOGY | Facility: CLINIC | Age: 25
End: 2025-08-13
Payer: COMMERCIAL

## 2025-08-13 VITALS
DIASTOLIC BLOOD PRESSURE: 84 MMHG | WEIGHT: 293 LBS | BODY MASS INDEX: 44.41 KG/M2 | OXYGEN SATURATION: 97 % | SYSTOLIC BLOOD PRESSURE: 137 MMHG | HEART RATE: 98 BPM | HEIGHT: 68 IN

## 2025-08-13 DIAGNOSIS — R42 DIZZY: ICD-10-CM

## 2025-08-13 DIAGNOSIS — R00.2 PALPITATIONS: ICD-10-CM

## 2025-08-13 DIAGNOSIS — R07.2 PRECORDIAL PAIN: ICD-10-CM

## 2025-08-13 DIAGNOSIS — R55 SYNCOPE AND COLLAPSE: ICD-10-CM

## 2025-08-13 DIAGNOSIS — R00.0 TACHYCARDIA: Primary | ICD-10-CM

## 2025-08-13 DIAGNOSIS — R06.02 SHORTNESS OF BREATH: ICD-10-CM

## 2025-08-13 PROCEDURE — 99214 OFFICE O/P EST MOD 30 MIN: CPT | Performed by: NURSE PRACTITIONER

## 2025-08-13 PROCEDURE — 1160F RVW MEDS BY RX/DR IN RCRD: CPT | Performed by: NURSE PRACTITIONER

## 2025-08-13 PROCEDURE — 1159F MED LIST DOCD IN RCRD: CPT | Performed by: NURSE PRACTITIONER

## 2025-08-13 RX ORDER — LISINOPRIL 2.5 MG/1
2.5 TABLET ORAL DAILY
Qty: 90 TABLET | Refills: 3 | Status: SHIPPED | OUTPATIENT
Start: 2025-08-13

## 2025-08-13 RX ORDER — SUCRALFATE 1 G/1
1 TABLET ORAL
COMMUNITY